# Patient Record
Sex: FEMALE | Race: BLACK OR AFRICAN AMERICAN | Employment: FULL TIME | URBAN - METROPOLITAN AREA
[De-identification: names, ages, dates, MRNs, and addresses within clinical notes are randomized per-mention and may not be internally consistent; named-entity substitution may affect disease eponyms.]

---

## 2021-12-16 ENCOUNTER — APPOINTMENT (OUTPATIENT)
Dept: GENERAL RADIOLOGY | Age: 31
End: 2021-12-16
Attending: EMERGENCY MEDICINE
Payer: OTHER MISCELLANEOUS

## 2021-12-16 ENCOUNTER — HOSPITAL ENCOUNTER (EMERGENCY)
Age: 31
Discharge: HOME | End: 2021-12-16
Attending: EMERGENCY MEDICINE
Payer: OTHER MISCELLANEOUS

## 2021-12-16 VITALS
RESPIRATION RATE: 16 BRPM | DIASTOLIC BLOOD PRESSURE: 78 MMHG | WEIGHT: 165 LBS | HEART RATE: 78 BPM | SYSTOLIC BLOOD PRESSURE: 114 MMHG | OXYGEN SATURATION: 100 % | HEIGHT: 66 IN | TEMPERATURE: 98 F | BODY MASS INDEX: 26.52 KG/M2

## 2021-12-16 DIAGNOSIS — S40.012A CONTUSION OF LEFT SHOULDER, INITIAL ENCOUNTER: ICD-10-CM
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
## 2021-12-16 DIAGNOSIS — S53.402A ELBOW SPRAIN, LEFT, INITIAL ENCOUNTER: ICD-10-CM

## 2021-12-16 DIAGNOSIS — S29.019A THORACIC MYOFASCIAL STRAIN, INITIAL ENCOUNTER: ICD-10-CM

## 2021-12-16 PROCEDURE — 74011250637 HC RX REV CODE- 250/637: Performed by: PHYSICIAN ASSISTANT

## 2021-12-16 PROCEDURE — 96372 THER/PROPH/DIAG INJ SC/IM: CPT

## 2021-12-16 PROCEDURE — 74011250637 HC RX REV CODE- 250/637: Performed by: EMERGENCY MEDICINE

## 2021-12-16 PROCEDURE — 73030 X-RAY EXAM OF SHOULDER: CPT

## 2021-12-16 PROCEDURE — 99283 EMERGENCY DEPT VISIT LOW MDM: CPT

## 2021-12-16 PROCEDURE — 71100 X-RAY EXAM RIBS UNI 2 VIEWS: CPT

## 2021-12-16 PROCEDURE — 74011250636 HC RX REV CODE- 250/636: Performed by: PHYSICIAN ASSISTANT

## 2021-12-16 PROCEDURE — 73080 X-RAY EXAM OF ELBOW: CPT

## 2021-12-16 RX ORDER — HYDROCODONE BITARTRATE AND ACETAMINOPHEN 5; 325 MG/1; MG/1
1-2 TABLET ORAL
Qty: 11 TABLET | Refills: 0 | Status: SHIPPED | OUTPATIENT
Start: 2021-12-16 | End: 2021-12-19

## 2021-12-16 RX ORDER — NAPROXEN 250 MG/1
500 TABLET ORAL ONCE
Status: COMPLETED | OUTPATIENT
Start: 2021-12-16 | End: 2021-12-16

## 2021-12-16 RX ORDER — ONDANSETRON 4 MG/1
4 TABLET, ORALLY DISINTEGRATING ORAL
Status: COMPLETED | OUTPATIENT
Start: 2021-12-16 | End: 2021-12-16

## 2021-12-16 RX ORDER — METAXALONE 800 MG/1
800 TABLET ORAL ONCE
Status: COMPLETED | OUTPATIENT
Start: 2021-12-16 | End: 2021-12-16

## 2021-12-16 RX ORDER — NAPROXEN 500 MG/1
500 TABLET ORAL 2 TIMES DAILY WITH MEALS
Qty: 20 TABLET | Refills: 0 | Status: SHIPPED | OUTPATIENT
Start: 2021-12-16

## 2021-12-16 RX ORDER — HYDROMORPHONE HYDROCHLORIDE 1 MG/ML
1 INJECTION, SOLUTION INTRAMUSCULAR; INTRAVENOUS; SUBCUTANEOUS ONCE
Status: COMPLETED | OUTPATIENT
Start: 2021-12-16 | End: 2021-12-16

## 2021-12-16 RX ORDER — METHOCARBAMOL 750 MG/1
1500 TABLET, FILM COATED ORAL 4 TIMES DAILY
Qty: 40 TABLET | Refills: 0 | Status: SHIPPED | OUTPATIENT
Start: 2021-12-16

## 2021-12-16 RX ADMIN — HYDROMORPHONE HYDROCHLORIDE 1 MG: 1 INJECTION, SOLUTION INTRAMUSCULAR; INTRAVENOUS; SUBCUTANEOUS at 11:03

## 2021-12-16 RX ADMIN — ONDANSETRON 4 MG: 4 TABLET, ORALLY DISINTEGRATING ORAL at 11:03

## 2021-12-16 RX ADMIN — METAXALONE 800 MG: 800 TABLET ORAL at 12:53

## 2021-12-16 RX ADMIN — NAPROXEN 500 MG: 250 TABLET ORAL at 12:53

## 2021-12-16 NOTE — Clinical Note
[address] [facility] EMERGENCY DEPT   [facility] [id number]  [phone]    Work/School Note    Date: 12/16/2021    To Whom It May concern:    Colton Muñoz was seen and treated today in the emergency room by the following provider(s):  Attending Provider: Corrine Leary MD.      Colton Muñoz is excused from work/school on 12/16/21 and 12/17/21. She is medically clear to return to work/school on 12/18/2021.        Sincerely,          Sigrid Huitron MD

## 2021-12-16 NOTE — ED NOTES
I have reviewed discharge instructions with the patient and spouse. The patient and spouse verbalized understanding. Patient left ED via Discharge Method: ambulatory to Home with (family). Opportunity for questions and clarification provided. Patient given 3 scripts. To continue your aftercare when you leave the hospital, you may receive an automated call from our care team to check in on how you are doing. This is a free service and part of our promise to provide the best care and service to meet your aftercare needs.  If you have questions, or wish to unsubscribe from this service please call [phone]. Thank you for Choosing our [facility] Emergency Department.

## 2021-12-16 NOTE — ED TRIAGE NOTES
Patient ambulatory to triage with mask in place. Patient reports fell on the stairs at work. Pt reports left shoulder/arm pain.

## 2021-12-16 NOTE — DISCHARGE INSTRUCTIONS
Take medications as directed  Wear the sling for up to 48 hours as needed for comfort  Do not drink alcohol or drive while taking the prescription pain medications    Follow-up with occupational medicine    Avoid any heavy lifting  Apply ice to your elbow and shoulder    Return to ER for any worsening symptoms or new problems which may arise

## 2021-12-16 NOTE — ED PROVIDER NOTES
24-year-old female presents to the ER with complaints of left shoulder and left elbow, and left back pain  Patient slipped and fell while walking or yesterday landing on the left side  Denies head injury. No neck pain. Patient is right-hand dominant    The history is provided by the patient and the spouse. Shoulder Pain   The incident occurred 1 to 2 hours ago. The incident occurred at work. The injury mechanism was a fall. The left shoulder is affected. The pain is moderate. The pain has been constant since onset. The pain radiates. There is no history of shoulder injury. She has other injuries. There is no history of shoulder surgery. Pertinent negatives include no muscle weakness and no tingling. She reports no foreign bodies present. No past medical history on file. No past surgical history on file. No family history on file. Social History     Socioeconomic History    Marital status:      Spouse name: Not on file    Number of children: Not on file    Years of education: Not on file    Highest education level: Not on file   Occupational History    Not on file   Tobacco Use    Smoking status: Not on file    Smokeless tobacco: Not on file   Substance and Sexual Activity    Alcohol use: Not on file    Drug use: Not on file    Sexual activity: Not on file   Other Topics Concern    Not on file   Social History Narrative    Not on file     Social Determinants of Health     Financial Resource Strain:     Difficulty of Paying Living Expenses: Not on file   Food Insecurity:     Worried About Running Out of Food in the Last Year: Not on file    Katerina of Food in the Last Year: Not on file   Transportation Needs:     Lack of Transportation (Medical): Not on file    Lack of Transportation (Non-Medical):  Not on file   Physical Activity:     Days of Exercise per Week: Not on file    Minutes of Exercise per Session: Not on file   Stress:     Feeling of Stress : Not on file Social Connections:     Frequency of Communication with Friends and Family: Not on file    Frequency of Social Gatherings with Friends and Family: Not on file    Attends Confucianism Services: Not on file    Active Member of Clubs or Organizations: Not on file    Attends Club or Organization Meetings: Not on file    Marital Status: Not on file   Intimate Partner Violence:     Fear of Current or Ex-Partner: Not on file    Emotionally Abused: Not on file    Physically Abused: Not on file    Sexually Abused: Not on file   Housing Stability:     Unable to Pay for Housing in the Last Year: Not on file    Number of [place] in the Last Year: Not on file    Unstable Housing in the Last Year: Not on file         ALLERGIES: Tree and shrub pollen    Review of Systems   Constitutional: Negative for chills and fever. HENT: Negative for congestion, ear pain and rhinorrhea. Eyes: Negative for photophobia and discharge. Respiratory: Negative for cough and shortness of breath. Cardiovascular: Negative for chest pain and palpitations. Gastrointestinal: Negative for abdominal pain, constipation, diarrhea and vomiting. Endocrine: Negative for cold intolerance and heat intolerance. Genitourinary: Negative for dysuria and flank pain. Musculoskeletal: Negative for arthralgias, myalgias and neck pain. Skin: Negative for rash and wound. Allergic/Immunologic: Negative for environmental allergies and food allergies. Neurological: Negative for tingling, syncope and headaches. Hematological: Negative for adenopathy. Does not bruise/bleed easily. Psychiatric/Behavioral: Negative for dysphoric mood. The patient is not nervous/anxious. All other systems reviewed and are negative.       Vitals:    12/16/21 0943 12/16/21 1207   BP: 114/78    Pulse: 78    Resp: 16    Temp: 98 °F (36.7 °C)    SpO2: 100% 100%   Weight: 74.8 kg (165 lb)    Height: 5' 6\" (1.676 m)             Physical Exam  Vitals and nursing note reviewed. Constitutional:       General: She is in acute distress. Appearance: Normal appearance. She is well-developed and normal weight. HENT:      Head: Normocephalic and atraumatic. Right Ear: External ear normal.      Left Ear: External ear normal.      Mouth/Throat:      Pharynx: No oropharyngeal exudate. Eyes:      Extraocular Movements: Extraocular movements intact. Conjunctiva/sclera: Conjunctivae normal.      Pupils: Pupils are equal, round, and reactive to light. Neck:      Vascular: No JVD. Cardiovascular:      Rate and Rhythm: Normal rate and regular rhythm. Pulses: Normal pulses. Heart sounds: Normal heart sounds. No murmur heard. No friction rub. No gallop. Pulmonary:      Effort: Pulmonary effort is normal.      Breath sounds: Normal breath sounds. Musculoskeletal:         General: No deformity. Right shoulder: Normal.      Left shoulder: Tenderness and bony tenderness present. No crepitus. Decreased range of motion. Normal strength. Normal pulse. Right elbow: Normal.      Left elbow: No deformity or effusion. Normal range of motion. Tenderness present. Cervical back: Normal, normal range of motion and neck supple. Thoracic back: Tenderness and bony tenderness present. No swelling. Lumbar back: Normal.        Back:    Skin:     General: Skin is warm and dry. Capillary Refill: Capillary refill takes less than 2 seconds. Findings: No rash. Neurological:      General: No focal deficit present. Mental Status: She is alert and oriented to person, place, and time. Cranial Nerves: No cranial nerve deficit. Sensory: No sensory deficit. Gait: Gait normal.   Psychiatric:         Mood and Affect: Mood and affect normal.         Speech: Speech normal.         Behavior: Behavior normal. Behavior is cooperative. Thought Content:  Thought content normal.         Cognition and Memory: Cognition and memory normal.         Judgment: Judgment normal.          MDM  Number of Diagnoses or Management Options  Contusion of left shoulder, initial encounter: new and requires workup  Elbow sprain, left, initial encounter: new and requires workup  Fall, initial encounter: new and requires workup  Thoracic myofascial strain, initial encounter: new and requires workup  Diagnosis management comments: 57-year-old female presents after fall on her way to work today. Initial imaging of the left shoulder is unremarkable. We will also obtain imaging of the left elbow and left ribs based on pain and with palpation in those locations as well. Patient was already given a dose of narcotics at triage for pain. We will add a nonsteroidal and muscle relaxer while we obtain the rest of her images    1:45 PM  All imaging studies unremarkable    Patient will be placed in a sling for comfort    Instructed to follow-up with occupational medicine for recheck visit    Sling for up to 48 hours       Amount and/or Complexity of Data Reviewed  Tests in the radiology section of CPT®: ordered and reviewed  Tests in the medicine section of CPT®: ordered and reviewed  Decide to obtain previous medical records or to obtain history from someone other than the patient: yes  Obtain history from someone other than the patient: yes  Review and summarize past medical records: yes  Independent visualization of images, tracings, or specimens: yes    Risk of Complications, Morbidity, and/or Mortality  Presenting problems: moderate  Diagnostic procedures: moderate  Management options: low  General comments: Elements of this note have been dictated via voice recognition software. Text and phrases may be limited by the accuracy of the software. The chart has been reviewed, but errors may still be present.       Patient Progress  Patient progress: improved         Procedures

## 2022-07-05 ENCOUNTER — OFFICE VISIT (OUTPATIENT)
Dept: OCCUPATIONAL MEDICINE | Age: 32
End: 2022-07-05

## 2022-07-05 VITALS
RESPIRATION RATE: 12 BRPM | SYSTOLIC BLOOD PRESSURE: 130 MMHG | DIASTOLIC BLOOD PRESSURE: 80 MMHG | HEART RATE: 90 BPM | OXYGEN SATURATION: 98 % | TEMPERATURE: 98.2 F

## 2022-07-05 DIAGNOSIS — J02.0 STREPTOCOCCAL PHARYNGITIS: Primary | ICD-10-CM

## 2022-07-05 PROBLEM — Z20.822 EXPOSURE TO COVID-19 VIRUS: Status: ACTIVE | Noted: 2020-09-05

## 2022-07-05 PROBLEM — J01.10 ACUTE NON-RECURRENT FRONTAL SINUSITIS: Status: ACTIVE | Noted: 2019-12-26

## 2022-07-05 PROCEDURE — 99213 OFFICE O/P EST LOW 20 MIN: CPT

## 2022-07-05 RX ORDER — AZITHROMYCIN 250 MG/1
250 TABLET, FILM COATED ORAL SEE ADMIN INSTRUCTIONS
Qty: 6 TABLET | Refills: 0 | Status: SHIPPED | OUTPATIENT
Start: 2022-07-05 | End: 2022-07-10

## 2022-07-05 ASSESSMENT — ENCOUNTER SYMPTOMS
ALLERGIC/IMMUNOLOGIC NEGATIVE: 1
RHINORRHEA: 0
EYES NEGATIVE: 1
GASTROINTESTINAL NEGATIVE: 1
RESPIRATORY NEGATIVE: 1
TROUBLE SWALLOWING: 0
SINUS PAIN: 0
SORE THROAT: 1
SINUS PRESSURE: 0

## 2022-07-05 NOTE — PATIENT INSTRUCTIONS
Patient Education        Strep Throat: Care Instructions  Your Care Instructions     Strep throat is a bacterial infection that causes sudden, severe sore throat and fever. Strep throat, which is caused by bacteria called streptococcus, is treated with antibiotics. Sometimes a strep test is necessary to tell if the sore throat is caused by strep bacteria. Treatment can help ease symptoms andmay prevent future problems. Follow-up care is a key part of your treatment and safety. Be sure to make and go to all appointments, and call your doctor if you are having problems. It's also a good idea to know your test results and keep alist of the medicines you take. How can you care for yourself at home?  Take your antibiotics as directed. Do not stop taking them just because you feel better. You need to take the full course of antibiotics.  Strep throat can spread to others until 24 hours after you begin taking antibiotics. During this time, avoid contact with other people at work, school, or home, especially infants and children. Do not sneeze or cough on others, and wash your hands often. Keep your drinking glass and eating utensils separate from those of others. Wash these items well in hot, soapy water.  Gargle with warm salt water at least once each hour to help reduce swelling and make your throat feel better. Use 1 teaspoon of salt mixed in 8 fluid ounces of warm water.  Take an over-the-counter pain medication, such as acetaminophen (Tylenol), ibuprofen (Advil, Motrin), or naproxen (Aleve). Read and follow all instructions on the label.  Try an over-the-counter anesthetic throat spray or throat lozenges, which may help relieve throat pain.  Drink plenty of fluids. Fluids may help soothe an irritated throat. Hot fluids, such as tea or soup, may help your throat feel better.  Eat soft solids and drink plenty of clear liquids.  Flavored ice pops, ice cream, scrambled eggs, sherbet, and gelatin dessert (such as Jell-O) may also soothe the throat.  Get lots of rest.   Do not smoke, and avoid secondhand smoke. If you need help quitting, talk to your doctor about stop-smoking programs and medicines. These can increase your chances of quitting for good.  Use a vaporizer or humidifier to add moisture to the air in your bedroom. Follow the directions for cleaning the machine. When should you call for help? Call your doctor now or seek immediate medical care if:     You have a new or higher fever.      You have a fever with a stiff neck or severe headache.      You have new or worse trouble swallowing.      Your sore throat gets much worse on one side.      Your pain becomes much worse on one side of your throat. Watch closely for changes in your health, and be sure to contact your doctor if:     You are not getting better after 2 days (48 hours).      You do not get better as expected. Where can you learn more? Go to [email or web address]://[email or web address]. org and sign in to your Gravity account. Enter K625 in the Blue Rooster box to learn more about \"Strep Throat: Care Instructions. \"     If you do not have an account, please click on the \"Sign Up Now\" link. Current as of: September 8, 2021               Content Version: 13.3  © 2006-2022 Healthwise, Incorporated. Care instructions adapted under license by [facility] ([facility]). If you have questions about a medical condition or this instruction, always ask your healthcare professional. [place] [address] any warranty or liability for your use of this information.

## 2022-07-05 NOTE — LETTER
[facility]  [address] [id number]  Phone: [phone]  Fax: [phone]    JENNI NP        July 5, 2022     Patient: Mitzi Clements   YOB: 1990   Date of Visit: 7/5/2022       To Whom it May Concern:    Mitzi Clements was seen in my clinic on 7/5/2022. She may return to work on 7/7/22. If you have any questions or concerns, please don't hesitate to call.     Sincerely,         JENNI NP

## 2022-07-05 NOTE — PROGRESS NOTES
130/80 (Site: Right Upper Arm, Position: Sitting)   Pulse 90   Temp 98.2 °F (36.8 °C) (Oral)   Resp 12   LMP 07/05/2022   SpO2 98%      No results found for this visit on 07/05/22. Physical Exam  Constitutional:       Appearance: Normal appearance. She is normal weight. HENT:      Head: Normocephalic and atraumatic. Right Ear: Tympanic membrane, ear canal and external ear normal.      Left Ear: Tympanic membrane, ear canal and external ear normal.      Nose: Nose normal. No congestion or rhinorrhea. Mouth/Throat:      Mouth: Mucous membranes are moist.      Pharynx: Pharyngeal swelling and posterior oropharyngeal erythema present. No oropharyngeal exudate. Tonsils: No tonsillar exudate or tonsillar abscesses. Eyes:      Conjunctiva/sclera: Conjunctivae normal.      Pupils: Pupils are equal, round, and reactive to light. Cardiovascular:      Rate and Rhythm: Normal rate and regular rhythm. Pulses: Normal pulses. Heart sounds: Normal heart sounds. Pulmonary:      Effort: Pulmonary effort is normal.      Breath sounds: Normal breath sounds. Musculoskeletal:      Cervical back: Normal range of motion and neck supple. Skin:     General: Skin is warm and dry. Neurological:      General: No focal deficit present. Mental Status: She is alert and oriented to person, place, and time. Psychiatric:         Mood and Affect: Mood normal.         Behavior: Behavior normal.         Thought Content: Thought content normal.         Judgment: Judgment normal.         ASSESSMENT and PLAN    Margarita was seen today for pharyngitis. Diagnoses and all orders for this visit:    Streptococcal pharyngitis  -     azithromycin (ZITHROMAX) 250 MG tablet; Take 1 tablet by mouth See Admin Instructions for 5 days 500mg on day 1 followed by 250mg on days 2 - 5  -     AMB POC RAPID STREP A    Positive strep. Covid negative.  Ibuprofen as needed for throat pain and swelling per package

## 2022-07-11 ENCOUNTER — TELEPHONE (OUTPATIENT)
Dept: OCCUPATIONAL MEDICINE | Age: 32
End: 2022-07-11

## 2022-07-11 DIAGNOSIS — T36.95XA ANTIBIOTIC-INDUCED YEAST INFECTION: Primary | ICD-10-CM

## 2022-07-11 DIAGNOSIS — B37.9 ANTIBIOTIC-INDUCED YEAST INFECTION: Primary | ICD-10-CM

## 2022-07-11 RX ORDER — FLUCONAZOLE 150 MG/1
150 TABLET ORAL ONCE
Qty: 1 TABLET | Refills: 0 | Status: SHIPPED | OUTPATIENT
Start: 2022-07-11 | End: 2022-07-11

## 2022-07-11 NOTE — TELEPHONE ENCOUNTER
Patient reporting new vaginal discharge and itching following recent antibiotic course. Denies foul odor or abnormal vaginal bleeding at this time. Suspect antibiotic induced yeast infection. Prescription of Diflucan provided to pharmacy as requested. Follow up with PCP or OBGYN if no improvement of symptoms despite medication.

## 2022-08-29 ENCOUNTER — OFFICE VISIT (OUTPATIENT)
Dept: OCCUPATIONAL MEDICINE | Age: 32
End: 2022-08-29

## 2022-08-29 VITALS
RESPIRATION RATE: 16 BRPM | HEART RATE: 84 BPM | SYSTOLIC BLOOD PRESSURE: 138 MMHG | DIASTOLIC BLOOD PRESSURE: 98 MMHG | OXYGEN SATURATION: 99 % | TEMPERATURE: 98.6 F

## 2022-08-29 DIAGNOSIS — J02.9 SORE THROAT: ICD-10-CM

## 2022-08-29 DIAGNOSIS — J32.9 RHINOSINUSITIS: Primary | ICD-10-CM

## 2022-08-29 DIAGNOSIS — J31.0 RHINOSINUSITIS: Primary | ICD-10-CM

## 2022-08-29 LAB
GROUP A STREP ANTIGEN, POC: NEGATIVE
INFLUENZA A ANTIGEN, POC: NEGATIVE
INFLUENZA B ANTIGEN, POC: NEGATIVE
SARS-COV-2 RNA, POC: NEGATIVE
VALID INTERNAL CONTROL, POC: YES

## 2022-08-29 PROCEDURE — 4004F PT TOBACCO SCREEN RCVD TLK: CPT | Performed by: NURSE PRACTITIONER

## 2022-08-29 PROCEDURE — 87880 STREP A ASSAY W/OPTIC: CPT | Performed by: NURSE PRACTITIONER

## 2022-08-29 PROCEDURE — 87631 RESP VIRUS 3-5 TARGETS: CPT | Performed by: NURSE PRACTITIONER

## 2022-08-29 PROCEDURE — G8428 CUR MEDS NOT DOCUMENT: HCPCS | Performed by: NURSE PRACTITIONER

## 2022-08-29 PROCEDURE — G8419 CALC BMI OUT NRM PARAM NOF/U: HCPCS | Performed by: NURSE PRACTITIONER

## 2022-08-29 PROCEDURE — 99213 OFFICE O/P EST LOW 20 MIN: CPT | Performed by: NURSE PRACTITIONER

## 2022-08-29 RX ORDER — CETIRIZINE HYDROCHLORIDE 10 MG/1
10 TABLET ORAL DAILY
COMMUNITY
End: 2022-10-17

## 2022-08-29 ASSESSMENT — ENCOUNTER SYMPTOMS
DIARRHEA: 0
WHEEZING: 0
SINUS PRESSURE: 1
COUGH: 1
VOMITING: 0
EYE DISCHARGE: 0
NAUSEA: 0
RHINORRHEA: 0
SORE THROAT: 1
SINUS PAIN: 0
VOICE CHANGE: 1
SWOLLEN GLANDS: 0
EYE ITCHING: 0
EYE REDNESS: 1
EYE PAIN: 0
SHORTNESS OF BREATH: 0
ABDOMINAL PAIN: 0

## 2022-08-29 NOTE — PROGRESS NOTES
PROGRESS NOTE    SUBJECTIVE:   Daisy South is a 33 y.o. female seen for what she suspects is \"another sinus infection\". Reports that she has helped someone move for the past 2 weekends and that her alleriges have been consistently flared up since despite taking her Cetirizine and restarting her Fluticasone spray 2 days ago. Denies any known or suspect sick contact and states no one else in household has similar symptoms. Chief Complaint    URI         URI   This is a new problem. The current episode started in the past 7 days. The problem has been gradually worsening. Associated symptoms include coughing, headaches, sneezing and a sore throat. Pertinent negatives include no abdominal pain, chest pain, congestion, diarrhea, dysuria, ear pain, joint pain, joint swelling, nausea, neck pain, plugged ear sensation, rash, rhinorrhea, sinus pain, swollen glands, vomiting or wheezing. Associated symptoms comments: Hoarseness, postnasal drainage. Treatments tried: Zyrtec, pseudofed, flonase, sinus rinse. The treatment provided mild relief. Current Outpatient Medications   Medication Sig Dispense Refill    cetirizine (ZYRTEC) 10 MG tablet Take 10 mg by mouth daily      fluticasone (FLONASE) 50 MCG/ACT nasal spray 2 sprays by Nasal route daily as needed       No current facility-administered medications for this visit. Allergies   Allergen Reactions    Other Other (See Comments)     Tree and shrub pollen-sneezing             Review of Systems   Constitutional:  Positive for chills. Negative for fatigue. HENT:  Positive for postnasal drip, sinus pressure, sneezing, sore throat and voice change. Negative for congestion, ear pain, rhinorrhea and sinus pain. Eyes:  Positive for redness. Negative for pain, discharge and itching. Respiratory:  Positive for cough. Negative for shortness of breath and wheezing. Cardiovascular:  Negative for chest pain.    Gastrointestinal:  Negative for abdominal pain, diarrhea, nausea and vomiting. Genitourinary:  Negative for dysuria. Musculoskeletal:  Negative for joint pain and neck pain. Skin:  Negative for rash. Allergic/Immunologic: Positive for environmental allergies. Neurological:  Positive for headaches. Negative for dizziness and light-headedness. OBJECTIVE:  BP (!) 138/98 (Site: Left Upper Arm, Position: Sitting, Cuff Size: Medium Adult)   Pulse 84   Temp 98.6 °F (37 °C) (Oral)   Resp 16   LMP 08/03/2022 (Approximate)   SpO2 99%      Results for orders placed or performed in visit on 08/29/22   AMB POC SARS-COV-2 AND INFLUENZA A/B   Result Value Ref Range    SARS-COV-2 RNA, POC Negative     Influenza A Antigen, POC Negative Negative    Influenza B Antigen, POC Negative Negative   AMB POC RAPID STREP A   Result Value Ref Range    Valid Internal Control, POC yes     Group A Strep Antigen, POC Negative Negative       Physical Exam  Vitals reviewed. Constitutional:       General: She is awake. She is not in acute distress. Appearance: Normal appearance. She is well-developed and well-groomed. HENT:      Head: Normocephalic. Right Ear: Hearing and external ear normal. A middle ear effusion is present. Left Ear: Hearing and external ear normal. A middle ear effusion is present. Nose: Mucosal edema present. Right Turbinates: Swollen and pale. Left Turbinates: Swollen and pale. Right Sinus: Maxillary sinus tenderness present. No frontal sinus tenderness. Left Sinus: No maxillary sinus tenderness or frontal sinus tenderness. Mouth/Throat:      Lips: Pink. No lesions. Mouth: Mucous membranes are moist.      Pharynx: Uvula midline. Pharyngeal swelling, posterior oropharyngeal erythema and uvula swelling present. No oropharyngeal exudate. Tonsils: No tonsillar exudate. 2+ on the right. 2+ on the left.    Eyes:      General: Lids are normal.      Conjunctiva/sclera: Conjunctivae normal.   Neck: Trachea: No tracheal deviation. Cardiovascular:      Rate and Rhythm: Normal rate and regular rhythm. Heart sounds: Normal heart sounds. Pulmonary:      Effort: Pulmonary effort is normal.      Breath sounds: Normal breath sounds. Musculoskeletal:      Cervical back: Neck supple. Lymphadenopathy:      Head:      Right side of head: Submandibular adenopathy present. No submental, tonsillar, preauricular, posterior auricular or occipital adenopathy. Left side of head: Submandibular adenopathy present. No submental, tonsillar, preauricular, posterior auricular or occipital adenopathy. Neurological:      Mental Status: She is alert and oriented to person, place, and time. Psychiatric:         Behavior: Behavior is cooperative. ASSESSMENT and PLAN    Haleigh Ray was seen today for uri. Diagnoses and all orders for this visit:    Rhinosinusitis  Comments:  Continue Cetirizine and Fluticasone. Encouraged rest, hydration, saline sinus rinses, and OTC cold medication per packaging for relief. RTC as needed  Orders:  -     AMB POC SARS-COV-2 AND INFLUENZA A/B    Sore throat  Comments:  Continue Cetirizine and Fluticasone. Encouraged rest, hydration, saline sinus rinses, and OTC cold medication per packaging for relief. RTC as needed  Orders:  -     AMB POC RAPID STREP A      Counseled on benefits of having a primary care provider which includes, but is not limited to, continuity of care and having a medical home when concerns arise. Also enforced that onsite clinic policy states that we are not to take the place of a primary care provider, pt verbalized understanding. SEs and risk vs benefits associated with medications prescribed discussed with patient who verbalized understanding. Pt verbalized understanding and agreement with plan of care. RTC for persisting/worsening symptoms or new complaints that arise.  Discussed signs and symptoms that would warrant immediate evaluation including, but not limited to HA, blurred vision, speech disturbance, difficulty with ambulation/gait, numbness, tingling, weakness, syncope, chest pain, or shortness of breath. I have reviewed the patient's medication list, past medical, family, social, and surgical history in detail and updated the patient record appropriately.     Aliyah Lamar, APRN - CNP

## 2022-10-17 ENCOUNTER — OFFICE VISIT (OUTPATIENT)
Dept: OCCUPATIONAL MEDICINE | Age: 32
End: 2022-10-17

## 2022-10-17 DIAGNOSIS — J10.1 INFLUENZA A: ICD-10-CM

## 2022-10-17 DIAGNOSIS — J02.0 STREPTOCOCCAL PHARYNGITIS: Primary | ICD-10-CM

## 2022-10-17 DIAGNOSIS — R68.89 FLU-LIKE SYMPTOMS: ICD-10-CM

## 2022-10-17 DIAGNOSIS — J02.9 SORE THROAT: ICD-10-CM

## 2022-10-17 LAB
GROUP A STREP ANTIGEN, POC: POSITIVE
INFLUENZA A ANTIGEN, POC: POSITIVE
INFLUENZA B ANTIGEN, POC: NEGATIVE
SARS-COV-2 RNA, POC: NEGATIVE
VALID INTERNAL CONTROL, POC: YES

## 2022-10-17 RX ORDER — MONTELUKAST SODIUM 10 MG/1
TABLET ORAL
COMMUNITY
Start: 2022-09-16

## 2022-10-17 RX ORDER — ALBUTEROL SULFATE 90 UG/1
AEROSOL, METERED RESPIRATORY (INHALATION)
COMMUNITY
Start: 2022-10-10

## 2022-10-17 RX ORDER — AZITHROMYCIN 250 MG/1
TABLET, FILM COATED ORAL
Qty: 6 TABLET | Refills: 0 | Status: SHIPPED | OUTPATIENT
Start: 2022-10-17

## 2022-10-17 RX ORDER — LEVOCETIRIZINE DIHYDROCHLORIDE 5 MG/1
TABLET, FILM COATED ORAL
COMMUNITY
Start: 2022-09-16

## 2022-10-17 ASSESSMENT — ENCOUNTER SYMPTOMS
EYE DISCHARGE: 0
DIARRHEA: 0
EYE PAIN: 0
VOMITING: 0
COUGH: 1
SORE THROAT: 1
VOICE CHANGE: 0
NAUSEA: 0
CHEST TIGHTNESS: 1
SINUS PAIN: 0
EYE REDNESS: 0
RHINORRHEA: 1
EYE ITCHING: 0
WHEEZING: 0
SINUS PRESSURE: 1
ABDOMINAL PAIN: 0
TROUBLE SWALLOWING: 0
SHORTNESS OF BREATH: 0
SWOLLEN GLANDS: 1

## 2022-10-17 NOTE — LETTER
[facility] [address] [id number]  Phone: [phone]  Fax: [phone]    CARLOS A Baker CNP        October 17, 2022     Patient: Geovanny Zuniga   YOB: 1990   Date of Visit: 10/17/2022       To Whom it May Concern:    Geovanny Zuniga was seen in my clinic on 10/17/2022. She may return to work on 10/18/22 if feeling better. If you have any questions or concerns, please don't hesitate to call.     Sincerely,         CARLOS A Baker CNP

## 2022-10-17 NOTE — PROGRESS NOTES
PROGRESS NOTE    SUBJECTIVE:   Anup Murray is a 33 y.o. female seen for flu like symptoms that started 4 days ago. Denies any known or suspected sick contacts but states her son has been having a runny nose. Chief Complaint    URI         URI   This is a new problem. The current episode started in the past 7 days. The problem has been unchanged. Associated symptoms include congestion, coughing, headaches, a plugged ear sensation, rhinorrhea, sneezing, a sore throat and swollen glands. Pertinent negatives include no abdominal pain, chest pain, diarrhea, dysuria, ear pain, joint pain, joint swelling, nausea, neck pain, rash, sinus pain, vomiting or wheezing. She has tried sleep (OTC cold and flu medications) for the symptoms. The treatment provided mild relief. Current Outpatient Medications   Medication Sig Dispense Refill    azithromycin (ZITHROMAX) 250 MG tablet 500mg on day 1 followed by 250mg on days 2 - 5 6 tablet 0    Fluticasone-Umeclidin-Vilant (TRELEGY ELLIPTA IN) Inhale into the lungs      albuterol sulfate HFA (PROVENTIL;VENTOLIN;PROAIR) 108 (90 Base) MCG/ACT inhaler       levocetirizine (XYZAL) 5 MG tablet TAKE 1 TABLET BY MOUTH EVERY DAY IN THE EVENING FOR 30 DAYS      montelukast (SINGULAIR) 10 MG tablet TAKE 1 TABLET BY MOUTH EVERY DAY FOR 30 DAYS      fluticasone (FLONASE) 50 MCG/ACT nasal spray 2 sprays by Nasal route daily as needed       No current facility-administered medications for this visit. Allergies   Allergen Reactions    Other Other (See Comments)     Tree and shrub pollen-sneezing             Review of Systems   Constitutional:  Positive for chills and fatigue. Negative for fever. HENT:  Positive for congestion, postnasal drip, rhinorrhea, sinus pressure, sneezing and sore throat. Negative for ear pain, sinus pain, trouble swallowing and voice change. Eyes:  Negative for pain, discharge, redness and itching. Respiratory:  Positive for cough and chest tightness. Negative for shortness of breath and wheezing. Cardiovascular:  Negative for chest pain. Gastrointestinal:  Negative for abdominal pain, diarrhea, nausea and vomiting. Genitourinary:  Negative for dysuria. Musculoskeletal:  Negative for joint pain and neck pain. Skin:  Negative for rash. Allergic/Immunologic: Positive for environmental allergies. Neurological:  Positive for headaches. Negative for dizziness and light-headedness. OBJECTIVE:  There were no vitals taken for this visit. Results for orders placed or performed in visit on 10/17/22   AMB POC RAPID STREP A   Result Value Ref Range    Valid Internal Control, POC yes     Group A Strep Antigen, POC Positive Negative   AMB POC SARS-COV-2 AND INFLUENZA A/B   Result Value Ref Range    SARS-COV-2 RNA, POC Negative     Influenza A Antigen, POC Positive Negative    Influenza B Antigen, POC Negative Negative       Physical Exam  Vitals reviewed. Constitutional:       General: She is awake. She is not in acute distress. Appearance: Normal appearance. She is well-developed and well-groomed. She is ill-appearing. HENT:      Head: Normocephalic. Right Ear: Hearing normal.      Left Ear: Hearing normal.      Nose: Mucosal edema present. Right Turbinates: Swollen. Left Turbinates: Swollen. Right Sinus: No maxillary sinus tenderness or frontal sinus tenderness. Left Sinus: No maxillary sinus tenderness or frontal sinus tenderness. Mouth/Throat:      Lips: Pink. No lesions. Mouth: Mucous membranes are moist.      Pharynx: Uvula midline. Pharyngeal swelling and posterior oropharyngeal erythema present. Tonsils: No tonsillar exudate. Eyes:      General: Lids are normal.      Conjunctiva/sclera: Conjunctivae normal.   Neck:      Trachea: No tracheal deviation. Pulmonary:      Effort: Pulmonary effort is normal.   Musculoskeletal:      Cervical back: Neck supple.    Lymphadenopathy:      Head: Right side of head: Submandibular adenopathy present. No submental, tonsillar, preauricular, posterior auricular or occipital adenopathy. Left side of head: Submandibular adenopathy present. No submental, tonsillar, preauricular, posterior auricular or occipital adenopathy. Neurological:      Mental Status: She is alert and oriented to person, place, and time. Psychiatric:         Behavior: Behavior is cooperative. ASSESSMENT and PLAN    Hattie Ortiz was seen today for uri. Diagnoses and all orders for this visit:    Streptococcal pharyngitis  Comments:  Azithromycin as prescribed with food. Infection control discussed and work excuse provided. Change toothbrush Wednesday. RTC as needed  Orders:  -     azithromycin (ZITHROMAX) 250 MG tablet; 500mg on day 1 followed by 250mg on days 2 - 5    Influenza A  Comments:  Outside window for antivirals. Encouraged rest, hydration, OTC therapies per package instructions for additional relief. RTC as needed    Sore throat  Comments:  Strep positive at this time  Orders:  -     azithromycin (ZITHROMAX) 250 MG tablet; 500mg on day 1 followed by 250mg on days 2 - 5  -     AMB POC RAPID STREP A    Flu-like symptoms  Comments:  Flu A positive at this time  Orders:  -     AMB POC SARS-COV-2 AND INFLUENZA A/B      Counseled on benefits of having a primary care provider which includes, but is not limited to, continuity of care and having a medical home when concerns arise. Also enforced that onsite clinic policy states that we are not to take the place of a primary care provider, pt verbalized understanding. SEs and risk vs benefits associated with medications prescribed discussed with patient who verbalized understanding. Pt verbalized understanding and agreement with plan of care. RTC for persisting/worsening symptoms or new complaints that arise.  Discussed signs and symptoms that would warrant immediate evaluation including, but not limited to HA, blurred vision, speech disturbance, difficulty with ambulation/gait, numbness, tingling, weakness, syncope, chest pain, or shortness of breath. I have reviewed the patient's medication list, past medical, family, social, and surgical history in detail and updated the patient record appropriately.     Norman Nieto, APRN - CNP

## 2022-11-17 ENCOUNTER — OFFICE VISIT (OUTPATIENT)
Dept: OCCUPATIONAL MEDICINE | Age: 32
End: 2022-11-17

## 2022-11-17 VITALS
DIASTOLIC BLOOD PRESSURE: 74 MMHG | TEMPERATURE: 98.8 F | SYSTOLIC BLOOD PRESSURE: 112 MMHG | HEART RATE: 75 BPM | OXYGEN SATURATION: 99 % | RESPIRATION RATE: 16 BRPM

## 2022-11-17 DIAGNOSIS — T88.7XXA SIDE EFFECT OF MEDICATION: ICD-10-CM

## 2022-11-17 DIAGNOSIS — Z20.818 STREP THROAT EXPOSURE: ICD-10-CM

## 2022-11-17 DIAGNOSIS — R68.89 FLU-LIKE SYMPTOMS: Primary | ICD-10-CM

## 2022-11-17 LAB
GROUP A STREP ANTIGEN, POC: NEGATIVE
INFLUENZA A ANTIGEN, POC: NEGATIVE
INFLUENZA A ANTIGEN, POC: POSITIVE
INFLUENZA B ANTIGEN, POC: NEGATIVE
INFLUENZA B ANTIGEN, POC: NEGATIVE
SARS-COV-2 RNA, POC: NEGATIVE
VALID INTERNAL CONTROL, POC: YES
VALID INTERNAL CONTROL, POC: YES

## 2022-11-17 RX ORDER — ONDANSETRON 4 MG/1
4 TABLET, ORALLY DISINTEGRATING ORAL EVERY 8 HOURS PRN
Qty: 9 TABLET | Refills: 0 | Status: SHIPPED | OUTPATIENT
Start: 2022-11-17

## 2022-11-17 RX ORDER — FEXOFENADINE HCL 180 MG/1
TABLET ORAL
COMMUNITY
Start: 2022-10-11

## 2022-11-17 RX ORDER — OSELTAMIVIR PHOSPHATE 75 MG/1
75 CAPSULE ORAL 2 TIMES DAILY
Qty: 10 CAPSULE | Refills: 0 | Status: SHIPPED | OUTPATIENT
Start: 2022-11-17 | End: 2022-11-22

## 2022-11-17 ASSESSMENT — ENCOUNTER SYMPTOMS
SWOLLEN GLANDS: 1
NAUSEA: 0
ABDOMINAL PAIN: 0
SINUS PAIN: 0
COUGH: 1
EYE ITCHING: 0
RHINORRHEA: 1
SINUS PRESSURE: 0
WHEEZING: 0
VOMITING: 0
SORE THROAT: 1
EYE DISCHARGE: 0
EYE PAIN: 0
EYE REDNESS: 0
DIARRHEA: 0

## 2022-11-17 NOTE — PROGRESS NOTES
PROGRESS NOTE    SUBJECTIVE:   Christiana Pederson is a 33 y.o. female seen for cold symptoms that started yesterday. Patient reports she fully recovered from having flu and strep a month ago then started to have symptoms return yesterday. States she had exposure to 2 employees that tested positive for strep last week. Chief Complaint    URI         URI   This is a recurrent problem. The current episode started yesterday. The problem has been gradually worsening. There has been no fever. Associated symptoms include congestion, coughing, headaches, rhinorrhea, sneezing, a sore throat and swollen glands. Pertinent negatives include no abdominal pain, chest pain, diarrhea, dysuria, ear pain, joint pain, joint swelling, nausea, neck pain, plugged ear sensation, rash, sinus pain, vomiting or wheezing. She has tried nothing for the symptoms. Current Outpatient Medications   Medication Sig Dispense Refill    oseltamivir (TAMIFLU) 75 MG capsule Take 1 capsule by mouth 2 times daily for 5 days 10 capsule 0    ondansetron (ZOFRAN ODT) 4 MG disintegrating tablet Take 1 tablet by mouth every 8 hours as needed for Nausea or Vomiting 9 tablet 0    fexofenadine (ALLEGRA) 180 MG tablet TAKE 1 TABLET BY MOUTH EVERY DAY FOR 30 DAYS SWALLOW WHOLE WITH WATER DO NOT TAKE WITH FRUIT JUICES      albuterol sulfate HFA (PROVENTIL;VENTOLIN;PROAIR) 108 (90 Base) MCG/ACT inhaler       levocetirizine (XYZAL) 5 MG tablet TAKE 1 TABLET BY MOUTH EVERY DAY IN THE EVENING FOR 30 DAYS      montelukast (SINGULAIR) 10 MG tablet TAKE 1 TABLET BY MOUTH EVERY DAY FOR 30 DAYS      Fluticasone-Umeclidin-Vilant (TRELEGY ELLIPTA IN) Inhale into the lungs      fluticasone (FLONASE) 50 MCG/ACT nasal spray 2 sprays by Nasal route daily as needed       No current facility-administered medications for this visit.       Allergies   Allergen Reactions    Other Other (See Comments)     Tree and shrub pollen-sneezing             Review of Systems   Constitutional: Positive for chills and fatigue. Negative for fever. HENT:  Positive for congestion, postnasal drip, rhinorrhea, sneezing and sore throat. Negative for ear pain, sinus pressure and sinus pain. Eyes:  Negative for pain, discharge, redness and itching. Respiratory:  Positive for cough. Negative for wheezing. Cardiovascular:  Negative for chest pain. Gastrointestinal:  Negative for abdominal pain, diarrhea, nausea and vomiting. Genitourinary:  Negative for dysuria. Musculoskeletal:  Positive for myalgias. Negative for arthralgias, joint pain and neck pain. Skin:  Negative for rash. Neurological:  Positive for headaches. Negative for dizziness and light-headedness. OBJECTIVE:  /74 (Site: Left Upper Arm, Position: Sitting, Cuff Size: Medium Adult)   Pulse 75   Temp 98.8 °F (37.1 °C) (Oral)   Resp 16   SpO2 99%      Results for orders placed or performed in visit on 11/17/22   AMB POC SARS-COV-2 AND INFLUENZA A/B   Result Value Ref Range    SARS-COV-2 RNA, POC Negative     Influenza A Antigen, POC Positive Negative    Influenza B Antigen, POC Negative Negative   AMB POC RAPID STREP A   Result Value Ref Range    Valid Internal Control, POC yes     Group A Strep Antigen, POC Negative Negative   AMB POC RAPID INFLUENZA TEST   Result Value Ref Range    Valid Internal Control, POC yes     Influenza A Antigen, POC Negative Negative    Influenza B Antigen, POC Negative Negative       Physical Exam  Vitals reviewed. Constitutional:       General: She is awake. She is not in acute distress. Appearance: Normal appearance. She is well-developed and well-groomed. HENT:      Head: Normocephalic. Right Ear: Hearing and external ear normal. A middle ear effusion is present. Left Ear: Hearing and external ear normal. A middle ear effusion is present. Nose: Mucosal edema present. Right Turbinates: Swollen. Left Turbinates: Swollen.       Right Sinus: No maxillary sinus tenderness or frontal sinus tenderness. Left Sinus: No maxillary sinus tenderness or frontal sinus tenderness. Mouth/Throat:      Lips: Pink. No lesions. Mouth: Mucous membranes are moist.      Pharynx: Uvula midline. Pharyngeal swelling and posterior oropharyngeal erythema present. Tonsils: No tonsillar exudate. Eyes:      General: Lids are normal.      Conjunctiva/sclera: Conjunctivae normal.   Neck:      Trachea: No tracheal deviation. Cardiovascular:      Rate and Rhythm: Normal rate and regular rhythm. Heart sounds: Normal heart sounds. Pulmonary:      Effort: Pulmonary effort is normal.      Breath sounds: Normal breath sounds. Musculoskeletal:      Cervical back: Neck supple. Lymphadenopathy:      Head:      Right side of head: Submandibular adenopathy present. No submental, tonsillar, preauricular, posterior auricular or occipital adenopathy. Left side of head: Submandibular adenopathy present. No submental, tonsillar, preauricular, posterior auricular or occipital adenopathy. Neurological:      Mental Status: She is alert and oriented to person, place, and time. Psychiatric:         Behavior: Behavior is cooperative. ASSESSMENT and PLAN    Eileen Tejada was seen today for uri. Diagnoses and all orders for this visit:    Flu-like symptoms  Comments:  Conflicting test results but will send in Tamiflu in case it is early in the disease process to have adequate viral shedding for strong rapid positive result  Orders:  -     AMB POC SARS-COV-2 AND INFLUENZA A/B  -     AMB POC RAPID INFLUENZA TEST  -     oseltamivir (TAMIFLU) 75 MG capsule; Take 1 capsule by mouth 2 times daily for 5 days    Side effect of medication  Comments:  Zofran 15-30 minutes prior to taking tamiflu dose to offset potential side effect of the medication  Orders:  -     ondansetron (ZOFRAN ODT) 4 MG disintegrating tablet;  Take 1 tablet by mouth every 8 hours as needed for Nausea or Vomiting    Strep throat exposure  Comments:  Strep negative at this time  Orders:  -     AMB POC RAPID STREP A      Counseled on benefits of having a primary care provider which includes, but is not limited to, continuity of care and having a medical home when concerns arise. Also enforced that onsite clinic policy states that we are not to take the place of a primary care provider, pt verbalized understanding. SEs and risk vs benefits associated with medications prescribed discussed with patient who verbalized understanding. Pt verbalized understanding and agreement with plan of care. RTC for persisting/worsening symptoms or new complaints that arise. Discussed signs and symptoms that would warrant immediate evaluation including, but not limited to HA, blurred vision, speech disturbance, difficulty with ambulation/gait, numbness, tingling, weakness, syncope, chest pain, or shortness of breath. I have reviewed the patient's medication list, past medical, family, social, and surgical history in detail and updated the patient record appropriately.     Mahesh Jackson, CARLOS A - CNP

## 2022-12-19 ENCOUNTER — OFFICE VISIT (OUTPATIENT)
Dept: OCCUPATIONAL MEDICINE | Age: 32
End: 2022-12-19

## 2022-12-19 VITALS
RESPIRATION RATE: 16 BRPM | HEART RATE: 70 BPM | TEMPERATURE: 98.3 F | OXYGEN SATURATION: 99 % | SYSTOLIC BLOOD PRESSURE: 126 MMHG | DIASTOLIC BLOOD PRESSURE: 86 MMHG

## 2022-12-19 DIAGNOSIS — J31.0 RHINOSINUSITIS: Primary | ICD-10-CM

## 2022-12-19 DIAGNOSIS — J32.9 RHINOSINUSITIS: Primary | ICD-10-CM

## 2022-12-19 LAB
INFLUENZA A ANTIGEN, POC: NEGATIVE
INFLUENZA B ANTIGEN, POC: NEGATIVE
SARS-COV-2 RNA, POC: NEGATIVE

## 2022-12-19 ASSESSMENT — ENCOUNTER SYMPTOMS
SWOLLEN GLANDS: 0
EYE PAIN: 0
VOMITING: 0
DIARRHEA: 0
EYE REDNESS: 0
SORE THROAT: 0
COUGH: 1
ABDOMINAL PAIN: 0
NAUSEA: 0
WHEEZING: 0
EYE DISCHARGE: 0
RHINORRHEA: 0
EYE ITCHING: 0
SINUS PRESSURE: 1
SINUS PAIN: 0

## 2022-12-19 NOTE — PROGRESS NOTES
PROGRESS NOTE    SUBJECTIVE:   Clive Tello is a 33 y.o. female seen for some mild cold symptoms that started yesterday. Denies any known or suspected sick contacts    Chief Complaint    URI         URI   This is a new problem. The current episode started yesterday. The problem has been gradually worsening. There has been no fever. Associated symptoms include congestion and coughing. Pertinent negatives include no abdominal pain, chest pain, diarrhea, dysuria, ear pain, headaches, joint pain, joint swelling, nausea, neck pain, plugged ear sensation, rash, rhinorrhea, sinus pain, sneezing, sore throat, swollen glands, vomiting or wheezing. Treatments tried: pseudofed. The treatment provided mild relief. Current Outpatient Medications   Medication Sig Dispense Refill    fexofenadine (ALLEGRA) 180 MG tablet TAKE 1 TABLET BY MOUTH EVERY DAY FOR 30 DAYS SWALLOW WHOLE WITH WATER DO NOT TAKE WITH FRUIT JUICES      albuterol sulfate HFA (PROVENTIL;VENTOLIN;PROAIR) 108 (90 Base) MCG/ACT inhaler       montelukast (SINGULAIR) 10 MG tablet TAKE 1 TABLET BY MOUTH EVERY DAY FOR 30 DAYS      fluticasone (FLONASE) 50 MCG/ACT nasal spray 2 sprays by Nasal route daily as needed       No current facility-administered medications for this visit. Allergies   Allergen Reactions    Other Other (See Comments)     Tree and shrub pollen-sneezing             Review of Systems   Constitutional:  Positive for fatigue. Negative for chills and fever. HENT:  Positive for congestion and sinus pressure. Negative for ear pain, postnasal drip, rhinorrhea, sinus pain, sneezing and sore throat. Eyes:  Negative for pain, discharge, redness and itching. Respiratory:  Positive for cough. Negative for wheezing. Cardiovascular:  Negative for chest pain. Gastrointestinal:  Negative for abdominal pain, diarrhea, nausea and vomiting. Genitourinary:  Negative for dysuria.    Musculoskeletal:  Negative for arthralgias, joint pain, myalgias and neck pain. Skin:  Negative for rash. Neurological:  Negative for dizziness and headaches. OBJECTIVE:  /86 (Site: Left Upper Arm, Position: Sitting, Cuff Size: Medium Adult)   Pulse 70   Temp 98.3 °F (36.8 °C) (Oral)   Resp 16   SpO2 99%      Results for orders placed or performed in visit on 12/19/22   AMB POC SARS-COV-2 AND INFLUENZA A/B   Result Value Ref Range    SARS-COV-2 RNA, POC Negative     Influenza A Antigen, POC Negative Negative    Influenza B Antigen, POC Negative Negative       Physical Exam  Vitals reviewed. Constitutional:       General: She is awake. She is not in acute distress. Appearance: Normal appearance. She is well-developed and well-groomed. HENT:      Head: Normocephalic. Right Ear: Hearing, ear canal and external ear normal. No drainage, swelling or tenderness. A middle ear effusion is present. Left Ear: Hearing, ear canal and external ear normal. No drainage, swelling or tenderness. A middle ear effusion is present. Nose: Mucosal edema and rhinorrhea present. Rhinorrhea is clear. Right Turbinates: Swollen and pale. Left Turbinates: Swollen and pale. Right Sinus: No maxillary sinus tenderness or frontal sinus tenderness. Left Sinus: No maxillary sinus tenderness or frontal sinus tenderness. Mouth/Throat:      Mouth: Mucous membranes are moist.      Pharynx: Oropharynx is clear. Uvula midline. No pharyngeal swelling or posterior oropharyngeal erythema. Tonsils: No tonsillar exudate. Comments: PND with cobblestoning  Eyes:      General: Lids are normal. No allergic shiner. Conjunctiva/sclera: Conjunctivae normal.      Right eye: Right conjunctiva is not injected. No chemosis. Left eye: Left conjunctiva is not injected. No chemosis. Neck:      Thyroid: No thyromegaly. Trachea: Trachea normal.   Cardiovascular:      Rate and Rhythm: Normal rate and regular rhythm.       Heart sounds: Normal heart sounds. Pulmonary:      Effort: Pulmonary effort is normal.      Breath sounds: Normal breath sounds. Musculoskeletal:      Cervical back: Normal range of motion and neck supple. Lymphadenopathy:      Head:      Right side of head: No submental, submandibular, tonsillar, preauricular, posterior auricular or occipital adenopathy. Left side of head: No submental, submandibular, tonsillar, preauricular, posterior auricular or occipital adenopathy. Skin:     General: Skin is warm and dry. Neurological:      Mental Status: She is alert and oriented to person, place, and time. Psychiatric:         Behavior: Behavior is cooperative. ASSESSMENT and PLAN    Eileen Tejada was seen today for uri. Diagnoses and all orders for this visit:    Rhinosinusitis  Comments:  COVID/Flu negative at this time. Encouraged rest, hydration, and OTC therapies per packaging for relief. RTC as needed, may need retesting in 24-48°  Orders:  -     AMB POC SARS-COV-2 AND INFLUENZA A/B      Counseled on benefits of having a primary care provider which includes, but is not limited to, continuity of care and having a medical home when concerns arise. Also enforced that onsite clinic policy states that we are not to take the place of a primary care provider, pt verbalized understanding. SEs and risk vs benefits associated with medications prescribed discussed with patient who verbalized understanding. Pt verbalized understanding and agreement with plan of care. RTC for persisting/worsening symptoms or new complaints that arise. Discussed signs and symptoms that would warrant immediate evaluation including, but not limited to HA, blurred vision, speech disturbance, difficulty with ambulation/gait, numbness, tingling, weakness, syncope, chest pain, or shortness of breath.     I have reviewed the patient's medication list, past medical, family, social, and surgical history in detail and updated the patient record appropriately.     Carroll Bran, APRN - CNP

## 2022-12-20 ENCOUNTER — TELEPHONE (OUTPATIENT)
Dept: OCCUPATIONAL MEDICINE | Age: 32
End: 2022-12-20

## 2022-12-20 DIAGNOSIS — J31.0 RHINOSINUSITIS: Primary | ICD-10-CM

## 2022-12-20 DIAGNOSIS — J32.9 RHINOSINUSITIS: Primary | ICD-10-CM

## 2022-12-20 RX ORDER — AZITHROMYCIN 250 MG/1
250 TABLET, FILM COATED ORAL SEE ADMIN INSTRUCTIONS
Qty: 6 TABLET | Refills: 0 | Status: SHIPPED | OUTPATIENT
Start: 2022-12-20 | End: 2022-12-25

## 2022-12-20 NOTE — TELEPHONE ENCOUNTER
Patient contacted clinic to request a prescription for Azithromycin be sent in to her pharmacy as she wanted to have it available to ensure that she is not worsening over the extended holiday weekend. Discussed that antibiotics are not indicated for viral illness and to refrain from starting the antibiotic unless conservative measures are found to not provide any relief.  Also educated that IDSA guidelines do not recommend azithromycin for sinusitis and that she could experience treatment failure with this antibiotic choice, patient verbalized understanding and still desires to have azithromycin sent

## 2023-01-25 ENCOUNTER — OFFICE VISIT (OUTPATIENT)
Dept: OCCUPATIONAL MEDICINE | Age: 33
End: 2023-01-25

## 2023-01-25 VITALS
SYSTOLIC BLOOD PRESSURE: 134 MMHG | TEMPERATURE: 98.5 F | RESPIRATION RATE: 16 BRPM | OXYGEN SATURATION: 99 % | HEART RATE: 89 BPM | DIASTOLIC BLOOD PRESSURE: 90 MMHG

## 2023-01-25 DIAGNOSIS — J02.9 SORE THROAT: ICD-10-CM

## 2023-01-25 DIAGNOSIS — J32.9 RHINOSINUSITIS: Primary | ICD-10-CM

## 2023-01-25 DIAGNOSIS — J31.0 RHINOSINUSITIS: Primary | ICD-10-CM

## 2023-01-25 RX ORDER — FLUTICASONE FUROATE, UMECLIDINIUM BROMIDE AND VILANTEROL TRIFENATATE 100; 62.5; 25 UG/1; UG/1; UG/1
POWDER RESPIRATORY (INHALATION)
COMMUNITY
Start: 2023-01-12

## 2023-01-25 RX ORDER — TRIAMCINOLONE ACETONIDE 55 UG/1
2 SPRAY, METERED NASAL DAILY
Qty: 1 EACH | Refills: 3 | Status: SHIPPED | OUTPATIENT
Start: 2023-01-25

## 2023-01-25 ASSESSMENT — ENCOUNTER SYMPTOMS
SORE THROAT: 1
SWOLLEN GLANDS: 1
EYE REDNESS: 0
SINUS PAIN: 0
VOMITING: 0
WHEEZING: 0
EYE PAIN: 0
SINUS PRESSURE: 1
RHINORRHEA: 1
COUGH: 1
EYE ITCHING: 0
EYE DISCHARGE: 0
NAUSEA: 0
DIARRHEA: 0
ABDOMINAL PAIN: 0

## 2023-01-25 NOTE — PROGRESS NOTES
PROGRESS NOTE    SUBJECTIVE:   Herson Castro is a 27 y.o. female seen for some mild cold symptoms that started yesterday. Reports son recently diagnosed with a double ear infection    Chief Complaint    URI         URI   This is a new problem. The current episode started yesterday. The problem has been gradually worsening. There has been no fever. Associated symptoms include congestion, coughing, headaches, rhinorrhea, a sore throat and swollen glands. Pertinent negatives include no abdominal pain, chest pain, diarrhea, dysuria, ear pain, joint pain, joint swelling, nausea, neck pain, plugged ear sensation, rash, sinus pain, sneezing, vomiting or wheezing. She has tried NSAIDs (allegra, singulair, trelegy, OTC cold medication) for the symptoms. The treatment provided mild relief. Current Outpatient Medications   Medication Sig Dispense Refill    triamcinolone (NASACORT) 55 MCG/ACT nasal inhaler 2 sprays by Each Nostril route daily 1 each 3    TRELEGY ELLIPTA 100-62.5-25 MCG/ACT AEPB inhaler       fexofenadine (ALLEGRA) 180 MG tablet TAKE 1 TABLET BY MOUTH EVERY DAY FOR 30 DAYS SWALLOW WHOLE WITH WATER DO NOT TAKE WITH FRUIT JUICES      albuterol sulfate HFA (PROVENTIL;VENTOLIN;PROAIR) 108 (90 Base) MCG/ACT inhaler       montelukast (SINGULAIR) 10 MG tablet TAKE 1 TABLET BY MOUTH EVERY DAY FOR 30 DAYS       No current facility-administered medications for this visit. Allergies   Allergen Reactions    Other Other (See Comments)     Tree and shrub pollen-sneezing             Review of Systems   Constitutional:  Positive for fatigue. Negative for chills and fever. HENT:  Positive for congestion, rhinorrhea, sinus pressure and sore throat. Negative for ear pain, postnasal drip, sinus pain and sneezing. Eyes:  Negative for pain, discharge, redness and itching. Respiratory:  Positive for cough. Negative for wheezing. Cardiovascular:  Negative for chest pain.    Gastrointestinal:  Negative for abdominal pain, diarrhea, nausea and vomiting. Genitourinary:  Negative for dysuria. Musculoskeletal:  Positive for myalgias. Negative for arthralgias, joint pain and neck pain. Skin:  Negative for rash. Neurological:  Positive for headaches. Negative for dizziness. OBJECTIVE:  BP (!) 134/90 (Site: Left Upper Arm, Position: Sitting, Cuff Size: Medium Adult)   Pulse 89   Temp 98.5 °F (36.9 °C) (Oral)   Resp 16   SpO2 99%      Results for orders placed or performed in visit on 01/25/23   AMB POC SARS-COV-2 AND INFLUENZA A/B   Result Value Ref Range    SARS-COV-2 RNA, POC Negative     Influenza A Antigen, POC Negative Negative    Influenza B Antigen, POC Negative Negative   AMB POC RAPID STREP A   Result Value Ref Range    Valid Internal Control, POC yes     Group A Strep Antigen, POC Negative Negative       Physical Exam  Vitals reviewed. Constitutional:       General: She is awake. She is not in acute distress. Appearance: Normal appearance. She is well-developed and well-groomed. HENT:      Head: Normocephalic. Right Ear: Hearing, ear canal and external ear normal. No drainage, swelling or tenderness. A middle ear effusion is present. Left Ear: Hearing, ear canal and external ear normal. No drainage, swelling or tenderness. A middle ear effusion is present. Nose: Mucosal edema and rhinorrhea present. Rhinorrhea is clear. Right Turbinates: Swollen and pale. Left Turbinates: Swollen and pale. Right Sinus: No maxillary sinus tenderness or frontal sinus tenderness. Left Sinus: No maxillary sinus tenderness or frontal sinus tenderness. Mouth/Throat:      Mouth: Mucous membranes are moist.      Pharynx: Oropharynx is clear. Uvula midline. No pharyngeal swelling or posterior oropharyngeal erythema. Tonsils: No tonsillar exudate. Comments: PND with cobblestoning  Eyes:      General: Lids are normal. No allergic shiner.      Conjunctiva/sclera: Conjunctivae normal.      Right eye: Right conjunctiva is not injected. No chemosis. Left eye: Left conjunctiva is not injected. No chemosis. Neck:      Thyroid: No thyromegaly. Trachea: Trachea normal.   Cardiovascular:      Rate and Rhythm: Normal rate and regular rhythm. Heart sounds: Normal heart sounds. Pulmonary:      Effort: Pulmonary effort is normal.      Breath sounds: Normal breath sounds. Musculoskeletal:      Cervical back: Normal range of motion and neck supple. Lymphadenopathy:      Head:      Right side of head: No submental, submandibular, tonsillar, preauricular, posterior auricular or occipital adenopathy. Left side of head: No submental, submandibular, tonsillar, preauricular, posterior auricular or occipital adenopathy. Skin:     General: Skin is warm and dry. Neurological:      Mental Status: She is alert and oriented to person, place, and time. Psychiatric:         Behavior: Behavior is cooperative. ASSESSMENT and PLAN    Jensen Nugent was seen today for uri. Diagnoses and all orders for this visit:    Rhinosinusitis  Comments:  COVID/Flu/strep negative at this time. Encouraged rest, hydration, and OTC therapies per packaging for relief. RTC as needed, may need retesting in 24-48°  Orders:  -     AMB POC SARS-COV-2 AND INFLUENZA A/B  -     triamcinolone (NASACORT) 55 MCG/ACT nasal inhaler; 2 sprays by Each Nostril route daily    Sore throat  Comments:  Encourage hydration, voice rest, warm salt water gargles, and OTC NSAIDs per packaging for relief  Orders:  -     AMB POC RAPID STREP A      Counseled on benefits of having a primary care provider which includes, but is not limited to, continuity of care and having a medical home when concerns arise. Also enforced that onsite clinic policy states that we are not to take the place of a primary care provider, pt verbalized understanding.      SEs and risk vs benefits associated with medications prescribed discussed with patient who verbalized understanding. Pt verbalized understanding and agreement with plan of care. RTC for persisting/worsening symptoms or new complaints that arise. Discussed signs and symptoms that would warrant immediate evaluation including, but not limited to HA, blurred vision, speech disturbance, difficulty with ambulation/gait, numbness, tingling, weakness, syncope, chest pain, or shortness of breath. I have reviewed the patient's medication list, past medical, family, social, and surgical history in detail and updated the patient record appropriately.     Shalonda Morin, APRN - CNP

## 2023-01-31 ENCOUNTER — OFFICE VISIT (OUTPATIENT)
Dept: OCCUPATIONAL MEDICINE | Age: 33
End: 2023-01-31

## 2023-01-31 VITALS
TEMPERATURE: 98.4 F | RESPIRATION RATE: 16 BRPM | HEART RATE: 76 BPM | SYSTOLIC BLOOD PRESSURE: 108 MMHG | OXYGEN SATURATION: 99 % | DIASTOLIC BLOOD PRESSURE: 84 MMHG

## 2023-01-31 DIAGNOSIS — J31.0 RHINOSINUSITIS: Primary | ICD-10-CM

## 2023-01-31 DIAGNOSIS — H10.33 ACUTE BACTERIAL CONJUNCTIVITIS OF BOTH EYES: ICD-10-CM

## 2023-01-31 DIAGNOSIS — J32.9 RHINOSINUSITIS: Primary | ICD-10-CM

## 2023-01-31 RX ORDER — AMOXICILLIN AND CLAVULANATE POTASSIUM 875; 125 MG/1; MG/1
1 TABLET, FILM COATED ORAL 2 TIMES DAILY
Qty: 10 TABLET | Refills: 0 | Status: SHIPPED | OUTPATIENT
Start: 2023-01-31 | End: 2023-02-05

## 2023-01-31 RX ORDER — POLYMYXIN B SULFATE AND TRIMETHOPRIM 1; 10000 MG/ML; [USP'U]/ML
1 SOLUTION OPHTHALMIC EVERY 6 HOURS
Qty: 10 ML | Refills: 0 | Status: SHIPPED | OUTPATIENT
Start: 2023-01-31 | End: 2023-02-07

## 2023-01-31 RX ORDER — PREDNISONE 5 MG/1
TABLET ORAL
Qty: 1 EACH | Refills: 0 | Status: SHIPPED | OUTPATIENT
Start: 2023-01-31

## 2023-01-31 ASSESSMENT — ENCOUNTER SYMPTOMS
SWOLLEN GLANDS: 0
EYE PAIN: 1
NAUSEA: 0
DOUBLE VISION: 0
SHORTNESS OF BREATH: 0
EYE ITCHING: 1
SINUS PRESSURE: 1
STRIDOR: 0
COUGH: 1
SORE THROAT: 0
EYE DISCHARGE: 1
HOARSE VOICE: 0
EYE REDNESS: 1
DIARRHEA: 0
CHEST TIGHTNESS: 0
RHINORRHEA: 1
SINUS PAIN: 1
PHOTOPHOBIA: 0
VOMITING: 0

## 2023-01-31 ASSESSMENT — VISUAL ACUITY: OU: 1

## 2023-01-31 NOTE — PROGRESS NOTES
PROGRESS NOTE    SUBJECTIVE:   Yodit Steinberg is a 27 y.o. female seen for persisting sinus infection symptoms and new onset of \"pink eye\" involving both of her eyes that started yesterday. Reports she is still taking OTC cold medication and allergy medications for her sinus symptoms with no improvement and that she has used OTC moisturizing drops as well as OTC Pataday allergy drops with no improvement in her eye symptoms. Chief Complaint    Conjunctivitis; Sinusitis         Conjunctivitis   The current episode started 2 days ago. The onset was sudden. The problem has been gradually worsening. The problem is moderate. Nothing relieves the symptoms. Nothing aggravates the symptoms. Associated symptoms include eye itching, congestion, headaches, rhinorrhea, cough, eye discharge, eye pain and eye redness. Pertinent negatives include no fever, no decreased vision, no double vision, no photophobia, no diarrhea, no nausea, no vomiting, no ear discharge, no ear pain, no hearing loss, no mouth sores, no sore throat, no stridor, no swollen glands and no neck pain. The eye pain is moderate. Both eyes are affected. The eye pain is not associated with movement. The eyelid exhibits redness. Sinusitis  This is a recurrent problem. The current episode started 1 to 4 weeks ago. The problem has been gradually worsening since onset. There has been no fever. The pain is mild. Associated symptoms include congestion, coughing, headaches and sinus pressure. Pertinent negatives include no chills, diaphoresis, ear pain, hoarse voice, neck pain, shortness of breath, sneezing, sore throat or swollen glands. Treatments tried: OTC cold medications, allegra, singulair, nasacort. The treatment provided mild relief.      Current Outpatient Medications   Medication Sig Dispense Refill    amoxicillin-clavulanate (AUGMENTIN) 875-125 MG per tablet Take 1 tablet by mouth 2 times daily for 5 days 10 tablet 0    predniSONE 5 MG (21) TBPK take as directed by 5 mg dose pack administration instructions 1 each 0    trimethoprim-polymyxin b (POLYTRIM) 68674-4.1 UNIT/ML-% ophthalmic solution Place 1 drop into both eyes in the morning and 1 drop at noon and 1 drop in the evening and 1 drop before bedtime. Do all this for 7 days. 10 mL 0    TRELEGY ELLIPTA 100-62.5-25 MCG/ACT AEPB inhaler       triamcinolone (NASACORT) 55 MCG/ACT nasal inhaler 2 sprays by Each Nostril route daily 1 each 3    fexofenadine (ALLEGRA) 180 MG tablet TAKE 1 TABLET BY MOUTH EVERY DAY FOR 30 DAYS SWALLOW WHOLE WITH WATER DO NOT TAKE WITH FRUIT JUICES      albuterol sulfate HFA (PROVENTIL;VENTOLIN;PROAIR) 108 (90 Base) MCG/ACT inhaler       montelukast (SINGULAIR) 10 MG tablet TAKE 1 TABLET BY MOUTH EVERY DAY FOR 30 DAYS       No current facility-administered medications for this visit. Allergies   Allergen Reactions    Other Other (See Comments)     Tree and shrub pollen-sneezing             Review of Systems   Constitutional:  Positive for fatigue. Negative for chills, diaphoresis and fever. HENT:  Positive for congestion, postnasal drip, rhinorrhea, sinus pressure and sinus pain. Negative for ear discharge, ear pain, hearing loss, hoarse voice, mouth sores, sneezing and sore throat. Eyes:  Positive for pain, discharge, redness and itching. Negative for double vision and photophobia. Respiratory:  Positive for cough. Negative for chest tightness, shortness of breath and stridor. Cardiovascular:  Negative for chest pain. Gastrointestinal:  Negative for diarrhea, nausea and vomiting. Musculoskeletal:  Negative for arthralgias, myalgias and neck pain. Allergic/Immunologic: Positive for environmental allergies. Neurological:  Positive for headaches. Negative for dizziness and light-headedness.         OBJECTIVE:  /84 (Site: Left Upper Arm, Position: Sitting, Cuff Size: Medium Adult)   Pulse 76   Temp 98.4 °F (36.9 °C) (Oral)   Resp 16   SpO2 99%      No results found for this visit on 01/31/23. Physical Exam  Vitals reviewed. Constitutional:       General: She is awake. She is not in acute distress. Appearance: Normal appearance. She is well-developed and well-groomed. HENT:      Head: Normocephalic. Right Ear: Hearing and external ear normal. A middle ear effusion is present. Left Ear: Hearing and external ear normal. A middle ear effusion is present. Nose: Mucosal edema present. Right Turbinates: Swollen. Left Turbinates: Swollen. Right Sinus: Maxillary sinus tenderness present. No frontal sinus tenderness. Left Sinus: Maxillary sinus tenderness present. No frontal sinus tenderness. Mouth/Throat:      Lips: Pink. No lesions. Mouth: Mucous membranes are moist.      Pharynx: Uvula midline. No pharyngeal swelling or posterior oropharyngeal erythema. Tonsils: No tonsillar exudate. Comments: PND with cobblestoning  Eyes:      General: Lids are normal. Lids are everted, no foreign bodies appreciated. Vision grossly intact. Right eye: No foreign body, discharge or hordeolum. Left eye: No foreign body, discharge or hordeolum. Extraocular Movements:      Right eye: Normal extraocular motion. Left eye: Normal extraocular motion. Conjunctiva/sclera:      Right eye: Right conjunctiva is injected. No exudate. Left eye: Left conjunctiva is injected. No exudate. Neck:      Trachea: No tracheal deviation. Cardiovascular:      Rate and Rhythm: Normal rate and regular rhythm. Heart sounds: Normal heart sounds. Pulmonary:      Effort: Pulmonary effort is normal.      Breath sounds: Normal breath sounds. Musculoskeletal:      Cervical back: Neck supple. Lymphadenopathy:      Head:      Right side of head: Submandibular adenopathy present. No submental, tonsillar, preauricular, posterior auricular or occipital adenopathy. Left side of head: Submandibular adenopathy present. No submental, tonsillar, preauricular, posterior auricular or occipital adenopathy. Neurological:      Mental Status: She is alert and oriented to person, place, and time. Psychiatric:         Behavior: Behavior is cooperative. ASSESSMENT and PLAN    Ailyn Potter was seen today for conjunctivitis and sinusitis. Diagnoses and all orders for this visit:    Rhinosinusitis  Comments:  Augmentin and prednisone as prescribed with food. Conintue current allergy regimen and encouraged saline sinus rinses for additional relief. RTC as needed  Orders:  -     amoxicillin-clavulanate (AUGMENTIN) 875-125 MG per tablet; Take 1 tablet by mouth 2 times daily for 5 days  -     predniSONE 5 MG (21) TBPK; take as directed by 5 mg dose pack administration instructions    Acute bacterial conjunctivitis of both eyes  Comments:  Polytrim as prescribed. Instructed pt to refrain from wearing eye make up while under treatment and to buy new products as old eye makeup can reinfect her eyes   Orders:  -     trimethoprim-polymyxin b (POLYTRIM) 08929-1.1 UNIT/ML-% ophthalmic solution; Place 1 drop into both eyes in the morning and 1 drop at noon and 1 drop in the evening and 1 drop before bedtime. Do all this for 7 days. Counseled on benefits of having a primary care provider which includes, but is not limited to, continuity of care and having a medical home when concerns arise. Also enforced that onsite clinic policy states that we are not to take the place of a primary care provider, pt verbalized understanding. SEs and risk vs benefits associated with medications prescribed discussed with patient who verbalized understanding. Pt verbalized understanding and agreement with plan of care. RTC for persisting/worsening symptoms or new complaints that arise.  Discussed signs and symptoms that would warrant immediate evaluation including, but not limited to HA, blurred vision, speech disturbance, difficulty with ambulation/gait, numbness, tingling, weakness, syncope, chest pain, or shortness of breath.    I have reviewed the patient's medication list, past medical, family, social, and surgical history in detail and updated the patient record appropriately.    Jim Ariza, APRN - CNP

## 2023-03-15 ENCOUNTER — TELEPHONE (OUTPATIENT)
Dept: OCCUPATIONAL MEDICINE | Age: 33
End: 2023-03-15

## 2023-05-30 ENCOUNTER — OFFICE VISIT (OUTPATIENT)
Dept: OCCUPATIONAL MEDICINE | Age: 33
End: 2023-05-30

## 2023-05-30 VITALS
OXYGEN SATURATION: 99 % | HEART RATE: 85 BPM | RESPIRATION RATE: 16 BRPM | DIASTOLIC BLOOD PRESSURE: 80 MMHG | TEMPERATURE: 98.1 F | SYSTOLIC BLOOD PRESSURE: 122 MMHG

## 2023-05-30 DIAGNOSIS — J32.9 RHINOSINUSITIS: ICD-10-CM

## 2023-05-30 DIAGNOSIS — J10.1 INFLUENZA A: Primary | ICD-10-CM

## 2023-05-30 DIAGNOSIS — J31.0 RHINOSINUSITIS: ICD-10-CM

## 2023-05-30 LAB
GROUP A STREP ANTIGEN, POC: NEGATIVE
INFLUENZA A ANTIGEN, POC: POSITIVE
INFLUENZA B ANTIGEN, POC: NEGATIVE
SARS-COV-2 RNA, POC: NEGATIVE
VALID INTERNAL CONTROL, POC: YES

## 2023-05-30 RX ORDER — OSELTAMIVIR PHOSPHATE 75 MG/1
75 CAPSULE ORAL 2 TIMES DAILY
Qty: 10 CAPSULE | Refills: 0 | Status: SHIPPED | OUTPATIENT
Start: 2023-05-30 | End: 2023-06-04

## 2023-05-30 ASSESSMENT — ENCOUNTER SYMPTOMS
SWOLLEN GLANDS: 1
SINUS PRESSURE: 1
SINUS PAIN: 1
EYE REDNESS: 0
EYE ITCHING: 0
EYE PAIN: 0
VOMITING: 0
SORE THROAT: 1
ABDOMINAL PAIN: 0
NAUSEA: 0
EYE DISCHARGE: 0
COUGH: 0
RHINORRHEA: 1
WHEEZING: 0
DIARRHEA: 0

## 2023-05-30 NOTE — PROGRESS NOTES
PROGRESS NOTE    SUBJECTIVE:   Kip East is a 27 y.o. female seen for mild cold symptoms that started 3-4 days ago while traveling to [place], [place] and [place] to go to [facility]. Chief Complaint    URI         URI   This is a new problem. Episode onset: 3-4 days. The problem has been unchanged. There has been no fever. Associated symptoms include congestion, ear pain, a plugged ear sensation, rhinorrhea, sinus pain, a sore throat and swollen glands. Pertinent negatives include no abdominal pain, chest pain, coughing, diarrhea, dysuria, headaches, joint pain, joint swelling, nausea, neck pain, rash, sneezing, vomiting or wheezing. Treatments tried: zicam, theraflu. tylneol PM, montelukast, fexofenadine. The treatment provided mild relief. Current Outpatient Medications   Medication Sig Dispense Refill    oseltamivir (TAMIFLU) 75 MG capsule Take 1 capsule by mouth 2 times daily for 5 days 10 capsule 0    LOTEMAX SM 0.38 % GEL PLACE 1 DROP INTO LEFT EYE 3 TIMES A DAY X 2 WEEKS THEN STOP      TRELEGY ELLIPTA 100-62.5-25 MCG/ACT AEPB inhaler       triamcinolone (NASACORT) 55 MCG/ACT nasal inhaler 2 sprays by Each Nostril route daily 1 each 3    fexofenadine (ALLEGRA) 180 MG tablet TAKE 1 TABLET BY MOUTH EVERY DAY FOR 30 DAYS SWALLOW WHOLE WITH WATER DO NOT TAKE WITH FRUIT JUICES      albuterol sulfate HFA (PROVENTIL;VENTOLIN;PROAIR) 108 (90 Base) MCG/ACT inhaler       montelukast (SINGULAIR) 10 MG tablet TAKE 1 TABLET BY MOUTH EVERY DAY FOR 30 DAYS       No current facility-administered medications for this visit. Allergies   Allergen Reactions    Other Other (See Comments)     Tree and shrub pollen-sneezing             Review of Systems   Constitutional:  Positive for fatigue. Negative for chills and fever. HENT:  Positive for congestion, ear pain, postnasal drip, rhinorrhea, sinus pressure, sinus pain and sore throat. Negative for sneezing. Eyes:  Negative for pain, discharge, redness and itching.

## 2023-08-25 ENCOUNTER — OFFICE VISIT (OUTPATIENT)
Dept: OCCUPATIONAL MEDICINE | Age: 33
End: 2023-08-25

## 2023-08-25 VITALS
TEMPERATURE: 98.2 F | OXYGEN SATURATION: 99 % | SYSTOLIC BLOOD PRESSURE: 122 MMHG | DIASTOLIC BLOOD PRESSURE: 80 MMHG | HEART RATE: 78 BPM | RESPIRATION RATE: 16 BRPM

## 2023-08-25 DIAGNOSIS — J31.0 RHINOSINUSITIS: ICD-10-CM

## 2023-08-25 DIAGNOSIS — J32.9 RHINOSINUSITIS: ICD-10-CM

## 2023-08-25 DIAGNOSIS — J02.0 STREP PHARYNGITIS: Primary | ICD-10-CM

## 2023-08-25 PROBLEM — H52.10 MYOPIA: Status: ACTIVE | Noted: 2021-02-03

## 2023-08-25 PROBLEM — H52.203 ASTIGMATISM OF BOTH EYES: Status: ACTIVE | Noted: 2021-02-03

## 2023-08-25 LAB
GROUP A STREP ANTIGEN, POC: POSITIVE
INFLUENZA A ANTIGEN, POC: NEGATIVE
INFLUENZA B ANTIGEN, POC: NEGATIVE
SARS-COV-2 RNA, POC: NEGATIVE
VALID INTERNAL CONTROL, POC: YES

## 2023-08-25 RX ORDER — AZITHROMYCIN 250 MG/1
250 TABLET, FILM COATED ORAL SEE ADMIN INSTRUCTIONS
Qty: 6 TABLET | Refills: 0 | Status: SHIPPED | OUTPATIENT
Start: 2023-08-25 | End: 2023-08-30

## 2023-08-25 RX ORDER — FEXOFENADINE HCL AND PSEUDOEPHEDRINE HCI 60; 120 MG/1; MG/1
TABLET, EXTENDED RELEASE ORAL
COMMUNITY

## 2023-08-25 ASSESSMENT — ENCOUNTER SYMPTOMS
EYE PAIN: 0
EYE ITCHING: 0
WHEEZING: 0
SINUS PRESSURE: 0
DIARRHEA: 0
SORE THROAT: 1
NAUSEA: 0
EYE REDNESS: 0
COUGH: 0
SINUS PAIN: 0
EYE DISCHARGE: 0
SWOLLEN GLANDS: 1
VOMITING: 0
ABDOMINAL PAIN: 0
RHINORRHEA: 1

## 2023-08-25 NOTE — PATIENT INSTRUCTIONS
information? Your pharmacist can provide more information about azithromycin. Remember, keep this and all other medicines out of the reach of children, never share your medicines with others, and use this medication only for the indication prescribed. Every effort has been made to ensure that the information provided by [address] [place] [address] is accurate, up-to-date, and complete, but no guarantee is made to that effect. Drug information contained herein may be time sensitive. [facility] information has been compiled for use by healthcare practitioners and consumers in the [facility] and therefore [facility] does not warrant that uses outside of the [facility] are appropriate, unless specifically indicated otherwise. [facility]'s drug information does not endorse drugs, diagnose patients or recommend therapy. [facility] drug information is an informational resource designed to assist licensed healthcare practitioners in caring for their patients and/or to serve consumers viewing this service as a supplement to, and not a substitute for, the expertise, skill, knowledge and judgment of healthcare practitioners. The absence of a warning for a given drug or drug combination in no way should be construed to indicate that the drug or drug combination is safe, effective or appropriate for any given patient. [facility] does not assume any responsibility for any aspect of healthcare administered with the aid of information [facility] provides. The information contained herein is not intended to cover all possible uses, directions, precautions, warnings, drug interactions, allergic reactions, or adverse effects. If you have questions about the drugs you are taking, check with your doctor, nurse or pharmacist.  Copyright 5580-8091 [address]: 18.01. Revision date: 5/2/2019. Care instructions adapted under license by [facility] ([facility]).  If you have questions about a medical condition or this instruction, always ask your

## 2023-08-25 NOTE — PROGRESS NOTES
PROGRESS NOTE    SUBJECTIVE:   Petra Serrato is a 27 y.o. female seen for mild cold symptoms that started last night. Reports she had close contact with someone Tuesday that thought they \"had a cold\" then tested positive for COVID the next day    Chief Complaint    URI         URI   This is a new problem. The current episode started yesterday. The problem has been gradually worsening. There has been no fever. Associated symptoms include congestion, rhinorrhea, a sore throat and swollen glands. Pertinent negatives include no abdominal pain, chest pain, coughing, diarrhea, dysuria, ear pain, headaches, joint pain, joint swelling, nausea, neck pain, plugged ear sensation, rash, sinus pain, sneezing, vomiting or wheezing. She has tried acetaminophen and antihistamine (nasacort) for the symptoms. The treatment provided mild relief. Current Outpatient Medications   Medication Sig Dispense Refill    fexofenadine-pseudoephedrine (ALLEGRA-D 12HR)  MG per extended release tablet Take by mouth      azithromycin (ZITHROMAX) 250 MG tablet Take 1 tablet by mouth See Admin Instructions for 5 days 500mg on day 1 followed by 250mg on days 2 - 5 6 tablet 0    LOTEMAX SM 0.38 % GEL PLACE 1 DROP INTO LEFT EYE 3 TIMES A DAY X 2 WEEKS THEN STOP      TRELEGY ELLIPTA 100-62.5-25 MCG/ACT AEPB inhaler       triamcinolone (NASACORT) 55 MCG/ACT nasal inhaler 2 sprays by Each Nostril route daily 1 each 3    fexofenadine (ALLEGRA) 180 MG tablet TAKE 1 TABLET BY MOUTH EVERY DAY FOR 30 DAYS SWALLOW WHOLE WITH WATER DO NOT TAKE WITH FRUIT JUICES      albuterol sulfate HFA (PROVENTIL;VENTOLIN;PROAIR) 108 (90 Base) MCG/ACT inhaler       montelukast (SINGULAIR) 10 MG tablet TAKE 1 TABLET BY MOUTH EVERY DAY FOR 30 DAYS       No current facility-administered medications for this visit.       Allergies   Allergen Reactions    Other Other (See Comments)     Tree and shrub pollen-sneezing             Review of Systems   Constitutional:  Positive

## 2023-10-02 ENCOUNTER — OFFICE VISIT (OUTPATIENT)
Dept: OCCUPATIONAL MEDICINE | Age: 33
End: 2023-10-02

## 2023-10-02 VITALS
TEMPERATURE: 98.5 F | SYSTOLIC BLOOD PRESSURE: 110 MMHG | RESPIRATION RATE: 16 BRPM | OXYGEN SATURATION: 99 % | DIASTOLIC BLOOD PRESSURE: 90 MMHG | HEART RATE: 67 BPM

## 2023-10-02 DIAGNOSIS — J02.0 STREP PHARYNGITIS: ICD-10-CM

## 2023-10-02 DIAGNOSIS — J10.1 INFLUENZA A: Primary | ICD-10-CM

## 2023-10-02 DIAGNOSIS — R68.89 FLU-LIKE SYMPTOMS: ICD-10-CM

## 2023-10-02 DIAGNOSIS — B37.9 ANTIBIOTIC-INDUCED YEAST INFECTION: ICD-10-CM

## 2023-10-02 DIAGNOSIS — T36.95XA ANTIBIOTIC-INDUCED YEAST INFECTION: ICD-10-CM

## 2023-10-02 RX ORDER — OSELTAMIVIR PHOSPHATE 75 MG/1
75 CAPSULE ORAL 2 TIMES DAILY
Qty: 10 CAPSULE | Refills: 0 | Status: SHIPPED | OUTPATIENT
Start: 2023-10-02 | End: 2023-10-07

## 2023-10-02 RX ORDER — FLUCONAZOLE 150 MG/1
150 TABLET ORAL
Qty: 2 TABLET | Refills: 0 | Status: SHIPPED | OUTPATIENT
Start: 2023-10-02 | End: 2023-10-08

## 2023-10-02 RX ORDER — AMOXICILLIN 500 MG/1
500 CAPSULE ORAL 2 TIMES DAILY
Qty: 20 CAPSULE | Refills: 0 | Status: SHIPPED | OUTPATIENT
Start: 2023-10-02 | End: 2023-10-12

## 2023-10-02 ASSESSMENT — ENCOUNTER SYMPTOMS
CONSTIPATION: 0
SORE THROAT: 1
SINUS PAIN: 0
COUGH: 0
EYE PAIN: 0
VOMITING: 0
NAUSEA: 0
SHORTNESS OF BREATH: 0
CHEST TIGHTNESS: 0
SINUS PRESSURE: 0
SWOLLEN GLANDS: 1
WHEEZING: 0
EYE ITCHING: 0
EYE REDNESS: 0
DIARRHEA: 0
RHINORRHEA: 0
EYE DISCHARGE: 0

## 2023-10-02 NOTE — PROGRESS NOTES
seen today for uri. Diagnoses and all orders for this visit:    Influenza A  Comments:  Tamiflu and amoxicillan with food as instructed. OTC therapies as needed. Orders:  -     oseltamivir (TAMIFLU) 75 MG capsule; Take 1 capsule by mouth 2 times daily for 5 days    Strep pharyngitis  Comments:  Tamiflu and amoxicillin with food as instructed. OTC therapies as needed. Orders:  -     amoxicillin (AMOXIL) 500 MG capsule; Take 1 capsule by mouth 2 times daily for 10 days    Antibiotic-induced yeast infection  Comments:  Diflucan as prescribed if needed. Orders:  -     fluconazole (DIFLUCAN) 150 MG tablet; Take 1 tablet by mouth every 72 hours for 6 days    Flu-like symptoms  -     AMB POC SARS-COV-2 AND INFLUENZA A/B  -     AMB POC RAPID STREP A        Counseled on benefits of having a primary care provider which includes, but is not limited to, continuity of care and having a medical home when concerns arise. Also enforced that onsite clinic policy states that we are not to take the place of a primary care provider, pt verbalized understanding. SEs and risk vs benefits associated with medications prescribed discussed with patient who verbalized understanding. Pt verbalized understanding and agreement with plan of care. RTC for persisting/worsening symptoms or new complaints that arise. Discussed signs and symptoms that would warrant immediate evaluation including, but not limited to HA, blurred vision, speech disturbance, difficulty with ambulation/gait, numbness, tingling, weakness, syncope, chest pain, or shortness of breath. I have reviewed the patient's medication list, past medical, family, social, and surgical history in detail and updated the patient record appropriately.     CARLOS A Palacio - YOLIE

## 2023-11-13 ENCOUNTER — OFFICE VISIT (OUTPATIENT)
Dept: OCCUPATIONAL MEDICINE | Age: 33
End: 2023-11-13

## 2023-11-13 VITALS
RESPIRATION RATE: 16 BRPM | OXYGEN SATURATION: 99 % | TEMPERATURE: 98.2 F | HEART RATE: 60 BPM | DIASTOLIC BLOOD PRESSURE: 72 MMHG | SYSTOLIC BLOOD PRESSURE: 120 MMHG

## 2023-11-13 DIAGNOSIS — J02.9 SORE THROAT: Primary | ICD-10-CM

## 2023-11-13 ASSESSMENT — ENCOUNTER SYMPTOMS
ABDOMINAL PAIN: 0
COUGH: 0
EYE ITCHING: 0
RHINORRHEA: 0
EYE PAIN: 0
SORE THROAT: 1
VOMITING: 0
NAUSEA: 0
SINUS PRESSURE: 0
SWOLLEN GLANDS: 1
SINUS PAIN: 0
EYE REDNESS: 0
EYE DISCHARGE: 0
CHANGE IN BOWEL HABIT: 0
VISUAL CHANGE: 0

## 2023-11-13 NOTE — PROGRESS NOTES
PROGRESS NOTE    SUBJECTIVE:   Shalonda Kay is a 27 y.o. female seen for sore throat that started yesterday. Reports she was at [facility] this weekend     Chief Complaint    Pharyngitis         Pharyngitis  This is a new problem. The current episode started yesterday. The problem has been waxing and waning. Associated symptoms include congestion, fatigue, headaches, a sore throat and swollen glands. Pertinent negatives include no abdominal pain, anorexia, arthralgias, change in bowel habit, chest pain, chills, coughing, diaphoresis, fever, joint swelling, myalgias, nausea, neck pain, numbness, rash, urinary symptoms, vertigo, visual change, vomiting or weakness. The symptoms are aggravated by drinking, eating and swallowing. She has tried sleep (Excedrin) for the symptoms. The treatment provided moderate relief. Current Outpatient Medications   Medication Sig Dispense Refill    fexofenadine-pseudoephedrine (ALLEGRA-D 12HR)  MG per extended release tablet Take by mouth      LOTEMAX SM 0.38 % GEL PLACE 1 DROP INTO LEFT EYE 3 TIMES A DAY X 2 WEEKS THEN STOP      TRELEGY ELLIPTA 100-62.5-25 MCG/ACT AEPB inhaler       triamcinolone (NASACORT) 55 MCG/ACT nasal inhaler 2 sprays by Each Nostril route daily 1 each 3    fexofenadine (ALLEGRA) 180 MG tablet TAKE 1 TABLET BY MOUTH EVERY DAY FOR 30 DAYS SWALLOW WHOLE WITH WATER DO NOT TAKE WITH FRUIT JUICES      albuterol sulfate HFA (PROVENTIL;VENTOLIN;PROAIR) 108 (90 Base) MCG/ACT inhaler       montelukast (SINGULAIR) 10 MG tablet TAKE 1 TABLET BY MOUTH EVERY DAY FOR 30 DAYS       No current facility-administered medications for this visit. Allergies   Allergen Reactions    Other Other (See Comments)     Tree and shrub pollen-sneezing       Social History     Tobacco Use    Smoking status: Never        Review of Systems   Constitutional:  Positive for fatigue. Negative for chills, diaphoresis and fever. HENT:  Positive for congestion and sore throat.

## 2024-02-02 ENCOUNTER — OFFICE VISIT (OUTPATIENT)
Age: 34
End: 2024-02-02

## 2024-02-02 VITALS
DIASTOLIC BLOOD PRESSURE: 72 MMHG | HEART RATE: 74 BPM | RESPIRATION RATE: 16 BRPM | SYSTOLIC BLOOD PRESSURE: 124 MMHG | OXYGEN SATURATION: 99 % | TEMPERATURE: 98.5 F

## 2024-02-02 DIAGNOSIS — J32.9 RHINOSINUSITIS: Primary | ICD-10-CM

## 2024-02-02 ASSESSMENT — ENCOUNTER SYMPTOMS
RHINORRHEA: 1
SINUS PAIN: 0
EYE PAIN: 0
EYE ITCHING: 0
EYE REDNESS: 0
NAUSEA: 0
ABDOMINAL PAIN: 0
WHEEZING: 0
VOMITING: 0
SORE THROAT: 0
SWOLLEN GLANDS: 0
DIARRHEA: 0
COUGH: 0
SINUS PRESSURE: 1
EYE DISCHARGE: 0

## 2024-02-02 NOTE — PROGRESS NOTES
Eyes:  Negative for pain, discharge, redness and itching.   Respiratory:  Negative for cough and wheezing.    Cardiovascular:  Negative for chest pain.   Gastrointestinal:  Negative for abdominal pain, diarrhea, nausea and vomiting.   Genitourinary:  Negative for dysuria.   Musculoskeletal:  Negative for arthralgias, joint pain, myalgias and neck pain.   Skin:  Negative for rash.   Allergic/Immunologic: Positive for environmental allergies.   Neurological:  Negative for dizziness, light-headedness and headaches.          OBJECTIVE:  /72 (Site: Left Upper Arm, Position: Sitting, Cuff Size: Medium Adult)   Pulse 74   Temp 98.5 °F (36.9 °C) (Oral)   Resp 16   SpO2 99%      Results for orders placed or performed in visit on 02/02/24   AMB POC RAPID STREP A   Result Value Ref Range    Valid Internal Control, POC yes     Group A Strep Antigen, POC Negative    AMB POC SARS-COV-2 AND INFLUENZA A/B   Result Value Ref Range    SARS-COV-2 RNA, POC Negative     Influenza A Antigen, POC Negative     Influenza B Antigen, POC Negative        Physical Exam  Vitals reviewed.   Constitutional:       General: She is awake. She is not in acute distress.     Appearance: Normal appearance. She is well-developed and well-groomed.   HENT:      Head: Normocephalic.      Right Ear: Hearing, ear canal and external ear normal. No drainage, swelling or tenderness. A middle ear effusion is present.      Left Ear: Hearing, ear canal and external ear normal. No drainage, swelling or tenderness. A middle ear effusion is present.      Nose: Mucosal edema and rhinorrhea present. Rhinorrhea is clear.      Right Turbinates: Swollen and pale.      Left Turbinates: Swollen and pale.      Right Sinus: No maxillary sinus tenderness or frontal sinus tenderness.      Left Sinus: No maxillary sinus tenderness or frontal sinus tenderness.      Mouth/Throat:      Mouth: Mucous membranes are moist.      Pharynx: Oropharynx is clear. Uvula midline. No

## 2024-07-17 ENCOUNTER — OFFICE VISIT (OUTPATIENT)
Age: 34
End: 2024-07-17

## 2024-07-17 VITALS
OXYGEN SATURATION: 99 % | TEMPERATURE: 98.2 F | RESPIRATION RATE: 16 BRPM | SYSTOLIC BLOOD PRESSURE: 124 MMHG | HEART RATE: 93 BPM | DIASTOLIC BLOOD PRESSURE: 70 MMHG

## 2024-07-17 DIAGNOSIS — J02.9 SORE THROAT: Primary | ICD-10-CM

## 2024-07-17 ASSESSMENT — ENCOUNTER SYMPTOMS
EYE REDNESS: 0
VOMITING: 0
VISUAL CHANGE: 0
CHEST TIGHTNESS: 0
DIARRHEA: 0
SORE THROAT: 1
CHANGE IN BOWEL HABIT: 0
SINUS PRESSURE: 0
EYE PAIN: 0
NAUSEA: 0
EYE ITCHING: 0
SHORTNESS OF BREATH: 0
EYE DISCHARGE: 0
ABDOMINAL PAIN: 0
COUGH: 1
SINUS PAIN: 0
RHINORRHEA: 1
SWOLLEN GLANDS: 1

## 2024-07-17 NOTE — PROGRESS NOTES
understanding.     SEs and risk vs benefits associated with medications prescribed discussed with patient who verbalized understanding. Pt verbalized understanding and agreement with plan of care. RTC for persisting/worsening symptoms or new complaints that arise. Discussed signs and symptoms that would warrant immediate evaluation including, but not limited to HA, blurred vision, speech disturbance, difficulty with ambulation/gait, numbness, tingling, weakness, syncope, chest pain, or shortness of breath.    I have reviewed the patient's medication list, past medical, family, social, and surgical history in detail and updated the patient record appropriately.    Jim Ariza, APRN - CNP

## 2024-09-05 ENCOUNTER — OFFICE VISIT (OUTPATIENT)
Age: 34
End: 2024-09-05

## 2024-09-05 VITALS
HEART RATE: 112 BPM | OXYGEN SATURATION: 99 % | DIASTOLIC BLOOD PRESSURE: 78 MMHG | SYSTOLIC BLOOD PRESSURE: 112 MMHG | RESPIRATION RATE: 18 BRPM

## 2024-09-05 DIAGNOSIS — R42 LIGHTHEADEDNESS: Primary | ICD-10-CM

## 2024-09-05 RX ORDER — IRON FUM,PS/FOLIC ACID/VITC/B3 125-1-40-3
CAPSULE ORAL
COMMUNITY
Start: 2024-08-30

## 2024-09-05 RX ORDER — METOCLOPRAMIDE 10 MG/1
TABLET ORAL
COMMUNITY
Start: 2024-07-09

## 2024-09-05 RX ORDER — CETIRIZINE HYDROCHLORIDE 10 MG/1
1 TABLET ORAL DAILY
COMMUNITY

## 2024-09-05 RX ORDER — FLUTICASONE FUROATE, UMECLIDINIUM BROMIDE AND VILANTEROL TRIFENATATE 100; 62.5; 25 UG/1; UG/1; UG/1
POWDER RESPIRATORY (INHALATION)
COMMUNITY

## 2024-09-05 ASSESSMENT — ENCOUNTER SYMPTOMS
PHOTOPHOBIA: 0
NAUSEA: 0
VOMITING: 0
SORE THROAT: 0
VISUAL CHANGE: 0
EYE DISCHARGE: 0
EYE PAIN: 0
ABDOMINAL PAIN: 0
SWOLLEN GLANDS: 0
EYE ITCHING: 0
CHANGE IN BOWEL HABIT: 0
EYE REDNESS: 0
COUGH: 0

## 2024-09-05 NOTE — PROGRESS NOTES
PROGRESS NOTE    SUBJECTIVE:   Margarita Person is a 33 y.o. female seen for lightheadedness with standing and seeing \"black spots\" intermittently. She is almost 38 weeks pregnant and has follow up with her OB tomorrow. States recent lab work showed that her iron is low. Confirms that she had a breakfast consisting of leonard, eggs, and an english muffin ~ 1.5 hours prior to clinic presentation and that typically she drinks ~ 80 oz of water daily though has only had about 8 oz so far this morning.     Chief Complaint    Dizziness         Dizziness  This is a new problem. The current episode started today. The problem has been waxing and waning. Associated symptoms include fatigue. Pertinent negatives include no abdominal pain, anorexia, arthralgias, change in bowel habit, chest pain, chills, congestion, coughing, diaphoresis, fever, headaches, joint swelling, myalgias, nausea, neck pain, numbness, rash, sore throat, swollen glands, urinary symptoms, vertigo, visual change, vomiting or weakness. The symptoms are aggravated by standing. She has tried nothing for the symptoms.       Current Outpatient Medications   Medication Sig Dispense Refill    metoclopramide (REGLAN) 10 MG tablet       Fe Fum-FePoly-FA-Vit C-Vit B3 (FOLIVANE-F) 125-1 MG CAPS       fluticasone-umeclidin-vilant (TRELEGY ELLIPTA) 100-62.5-25 MCG/ACT AEPB inhaler INHALE 1 PUFF INTO THE LUNGS EVERY DAY FOR 30 DAYS      cetirizine (ZYRTEC ALLERGY) 10 MG tablet Take 1 tablet by mouth daily      LOTEMAX SM 0.38 % GEL PLACE 1 DROP INTO LEFT EYE 3 TIMES A DAY X 2 WEEKS THEN STOP      triamcinolone (NASACORT) 55 MCG/ACT nasal inhaler 2 sprays by Each Nostril route daily 1 each 3    fexofenadine (ALLEGRA) 180 MG tablet TAKE 1 TABLET BY MOUTH EVERY DAY FOR 30 DAYS SWALLOW WHOLE WITH WATER DO NOT TAKE WITH FRUIT JUICES      albuterol sulfate HFA (PROVENTIL;VENTOLIN;PROAIR) 108 (90 Base) MCG/ACT inhaler       montelukast (SINGULAIR) 10 MG tablet TAKE 1 TABLET

## 2024-09-08 ENCOUNTER — PREP FOR PROCEDURE (OUTPATIENT)
Dept: OBGYN | Age: 34
End: 2024-09-08

## 2024-09-08 RX ORDER — MISOPROSTOL 200 UG/1
400 TABLET ORAL PRN
Status: CANCELLED | OUTPATIENT
Start: 2024-09-08

## 2024-09-08 RX ORDER — SODIUM CHLORIDE, SODIUM LACTATE, POTASSIUM CHLORIDE, CALCIUM CHLORIDE 600; 310; 30; 20 MG/100ML; MG/100ML; MG/100ML; MG/100ML
INJECTION, SOLUTION INTRAVENOUS CONTINUOUS
Status: CANCELLED | OUTPATIENT
Start: 2024-09-08

## 2024-09-08 RX ORDER — CARBOPROST TROMETHAMINE 250 UG/ML
250 INJECTION, SOLUTION INTRAMUSCULAR PRN
Status: CANCELLED | OUTPATIENT
Start: 2024-09-08

## 2024-09-08 RX ORDER — ONDANSETRON 2 MG/ML
4 INJECTION INTRAMUSCULAR; INTRAVENOUS EVERY 6 HOURS PRN
Status: CANCELLED | OUTPATIENT
Start: 2024-09-08

## 2024-09-08 RX ORDER — DOCUSATE SODIUM 100 MG/1
100 CAPSULE, LIQUID FILLED ORAL 2 TIMES DAILY
Status: CANCELLED | OUTPATIENT
Start: 2024-09-08

## 2024-09-08 RX ORDER — ONDANSETRON 4 MG/1
4 TABLET, ORALLY DISINTEGRATING ORAL EVERY 6 HOURS PRN
Status: CANCELLED | OUTPATIENT
Start: 2024-09-08

## 2024-09-08 RX ORDER — SODIUM CHLORIDE, SODIUM LACTATE, POTASSIUM CHLORIDE, AND CALCIUM CHLORIDE .6; .31; .03; .02 G/100ML; G/100ML; G/100ML; G/100ML
500 INJECTION, SOLUTION INTRAVENOUS PRN
Status: CANCELLED | OUTPATIENT
Start: 2024-09-08

## 2024-09-08 RX ORDER — TRANEXAMIC ACID 10 MG/ML
1000 INJECTION, SOLUTION INTRAVENOUS
Status: CANCELLED | OUTPATIENT
Start: 2024-09-08 | End: 2024-09-09

## 2024-09-08 RX ORDER — SODIUM CHLORIDE 0.9 % (FLUSH) 0.9 %
5-40 SYRINGE (ML) INJECTION PRN
Status: CANCELLED | OUTPATIENT
Start: 2024-09-08

## 2024-09-08 RX ORDER — SODIUM CHLORIDE, SODIUM LACTATE, POTASSIUM CHLORIDE, AND CALCIUM CHLORIDE .6; .31; .03; .02 G/100ML; G/100ML; G/100ML; G/100ML
1000 INJECTION, SOLUTION INTRAVENOUS PRN
Status: CANCELLED | OUTPATIENT
Start: 2024-09-08

## 2024-09-08 RX ORDER — SODIUM CHLORIDE 0.9 % (FLUSH) 0.9 %
5-40 SYRINGE (ML) INJECTION EVERY 12 HOURS SCHEDULED
Status: CANCELLED | OUTPATIENT
Start: 2024-09-08

## 2024-09-08 RX ORDER — METHYLERGONOVINE MALEATE 0.2 MG/ML
200 INJECTION INTRAVENOUS PRN
Status: CANCELLED | OUTPATIENT
Start: 2024-09-08

## 2024-09-08 RX ORDER — BUTORPHANOL TARTRATE 1 MG/ML
1 INJECTION, SOLUTION INTRAMUSCULAR; INTRAVENOUS
Status: CANCELLED | OUTPATIENT
Start: 2024-09-08

## 2024-09-08 RX ORDER — SODIUM CHLORIDE 9 MG/ML
INJECTION, SOLUTION INTRAVENOUS PRN
Status: CANCELLED | OUTPATIENT
Start: 2024-09-08

## 2024-09-08 RX ORDER — TERBUTALINE SULFATE 1 MG/ML
0.25 INJECTION, SOLUTION SUBCUTANEOUS
Status: CANCELLED | OUTPATIENT
Start: 2024-09-08 | End: 2024-09-09

## 2024-09-11 ENCOUNTER — HOSPITAL ENCOUNTER (OUTPATIENT)
Age: 34
Discharge: HOME | End: 2024-09-11
Attending: OBSTETRICS & GYNECOLOGY | Admitting: OBSTETRICS & GYNECOLOGY
Payer: COMMERCIAL

## 2024-09-11 VITALS
HEART RATE: 81 BPM | SYSTOLIC BLOOD PRESSURE: 101 MMHG | DIASTOLIC BLOOD PRESSURE: 57 MMHG | OXYGEN SATURATION: 98 % | RESPIRATION RATE: 16 BRPM | TEMPERATURE: 98.3 F

## 2024-09-11 LAB
ALBUMIN SERPL-MCNC: 2.5 G/DL (ref 3.5–5)
ALBUMIN/GLOB SERPL: 0.8 (ref 1–1.9)
ALP SERPL-CCNC: 126 U/L (ref 35–104)
ALT SERPL-CCNC: 12 U/L (ref 12–65)
ANION GAP SERPL CALC-SCNC: 11 MMOL/L (ref 9–18)
AST SERPL-CCNC: 13 U/L (ref 15–37)
BASOPHILS # BLD: 0 K/UL (ref 0–0.2)
BASOPHILS NFR BLD: 0 % (ref 0–2)
BILIRUB SERPL-MCNC: <0.2 MG/DL (ref 0–1.2)
BUN SERPL-MCNC: 7 MG/DL (ref 6–23)
CALCIUM SERPL-MCNC: 8.3 MG/DL (ref 8.8–10.2)
CHLORIDE SERPL-SCNC: 105 MMOL/L (ref 98–107)
CO2 SERPL-SCNC: 21 MMOL/L (ref 20–28)
CREAT SERPL-MCNC: 0.59 MG/DL (ref 0.6–1.1)
CREAT UR-MCNC: 59.5 MG/DL (ref 28–217)
DIFFERENTIAL METHOD BLD: ABNORMAL
EOSINOPHIL # BLD: 0 K/UL (ref 0–0.8)
EOSINOPHIL NFR BLD: 1 % (ref 0.5–7.8)
ERYTHROCYTE [DISTWIDTH] IN BLOOD BY AUTOMATED COUNT: 14.3 % (ref 11.9–14.6)
GLOBULIN SER CALC-MCNC: 3 G/DL (ref 2.3–3.5)
GLUCOSE SERPL-MCNC: 86 MG/DL (ref 70–99)
HCT VFR BLD AUTO: 34.1 % (ref 35.8–46.3)
HGB BLD-MCNC: 11 G/DL (ref 11.7–15.4)
IMM GRANULOCYTES # BLD AUTO: 0 K/UL (ref 0–0.5)
IMM GRANULOCYTES NFR BLD AUTO: 0 % (ref 0–5)
LYMPHOCYTES # BLD: 1.5 K/UL (ref 0.5–4.6)
LYMPHOCYTES NFR BLD: 23 % (ref 13–44)
MCH RBC QN AUTO: 28.5 PG (ref 26.1–32.9)
MCHC RBC AUTO-ENTMCNC: 32.3 G/DL (ref 31.4–35)
MCV RBC AUTO: 88.3 FL (ref 82–102)
MONOCYTES # BLD: 0.6 K/UL (ref 0.1–1.3)
MONOCYTES NFR BLD: 9 % (ref 4–12)
NEUTS SEG # BLD: 4.5 K/UL (ref 1.7–8.2)
NEUTS SEG NFR BLD: 67 % (ref 43–78)
NRBC # BLD: 0 K/UL (ref 0–0.2)
PLATELET # BLD AUTO: 232 K/UL (ref 150–450)
PMV BLD AUTO: 9.7 FL (ref 9.4–12.3)
POTASSIUM SERPL-SCNC: 4 MMOL/L (ref 3.5–5.1)
PROT SERPL-MCNC: 5.4 G/DL (ref 6.3–8.2)
PROT UR-MCNC: 9 MG/DL
PROT/CREAT UR-RTO: 0.1
RBC # BLD AUTO: 3.86 M/UL (ref 4.05–5.2)
SODIUM SERPL-SCNC: 137 MMOL/L (ref 136–145)
URATE SERPL-MCNC: 3.1 MG/DL (ref 2.5–7.1)
WBC # BLD AUTO: 6.7 K/UL (ref 4.3–11.1)

## 2024-09-11 PROCEDURE — 99284 EMERGENCY DEPT VISIT MOD MDM: CPT

## 2024-09-11 PROCEDURE — 59025 FETAL NON-STRESS TEST: CPT

## 2024-09-11 PROCEDURE — 80053 COMPREHEN METABOLIC PANEL: CPT

## 2024-09-11 PROCEDURE — 84550 ASSAY OF BLOOD/URIC ACID: CPT

## 2024-09-11 PROCEDURE — 85025 COMPLETE CBC W/AUTO DIFF WBC: CPT

## 2024-09-11 PROCEDURE — 82570 ASSAY OF URINE CREATININE: CPT

## 2024-09-11 PROCEDURE — 84156 ASSAY OF PROTEIN URINE: CPT

## 2024-09-11 RX ORDER — METOCLOPRAMIDE 10 MG/1
10 TABLET ORAL 4 TIMES DAILY
Qty: 360 TABLET | Refills: 0 | OUTPATIENT
Start: 2024-09-11 | End: 2024-12-10

## 2024-09-24 ENCOUNTER — HOSPITAL ENCOUNTER (INPATIENT)
Age: 34
LOS: 2 days | Discharge: HOME | End: 2024-09-26
Attending: OBSTETRICS & GYNECOLOGY | Admitting: OBSTETRICS & GYNECOLOGY
Payer: COMMERCIAL

## 2024-09-24 DIAGNOSIS — Z3A.40 40 WEEKS GESTATION OF PREGNANCY: Primary | ICD-10-CM

## 2024-09-24 LAB
ABO + RH BLD: NORMAL
BLOOD GROUP ANTIBODIES SERPL: NORMAL
ERYTHROCYTE [DISTWIDTH] IN BLOOD BY AUTOMATED COUNT: 14.5 % (ref 11.9–14.6)
HCT VFR BLD AUTO: 36 % (ref 35.8–46.3)
HGB BLD-MCNC: 11.7 G/DL (ref 11.7–15.4)
MCH RBC QN AUTO: 28.3 PG (ref 26.1–32.9)
MCHC RBC AUTO-ENTMCNC: 32.5 G/DL (ref 31.4–35)
MCV RBC AUTO: 87 FL (ref 82–102)
NRBC # BLD: 0 K/UL (ref 0–0.2)
PLATELET # BLD AUTO: 270 K/UL (ref 150–450)
PMV BLD AUTO: 10.2 FL (ref 9.4–12.3)
RBC # BLD AUTO: 4.14 M/UL (ref 4.05–5.2)
SPECIMEN EXP DATE BLD: NORMAL
WBC # BLD AUTO: 6.7 K/UL (ref 4.3–11.1)

## 2024-09-24 PROCEDURE — 86780 TREPONEMA PALLIDUM: CPT

## 2024-09-24 PROCEDURE — 85027 COMPLETE CBC AUTOMATED: CPT

## 2024-09-24 PROCEDURE — 1100000000 HC RM PRIVATE

## 2024-09-24 PROCEDURE — 86901 BLOOD TYPING SEROLOGIC RH(D): CPT

## 2024-09-24 PROCEDURE — 86850 RBC ANTIBODY SCREEN: CPT

## 2024-09-24 PROCEDURE — 86900 BLOOD TYPING SEROLOGIC ABO: CPT

## 2024-09-24 RX ORDER — TERBUTALINE SULFATE 1 MG/ML
0.25 INJECTION, SOLUTION SUBCUTANEOUS
Status: ACTIVE | OUTPATIENT
Start: 2024-09-24 | End: 2024-09-25

## 2024-09-24 RX ORDER — TRANEXAMIC ACID 10 MG/ML
1000 INJECTION, SOLUTION INTRAVENOUS
Status: ACTIVE | OUTPATIENT
Start: 2024-09-24 | End: 2024-09-25

## 2024-09-24 RX ORDER — METHYLERGONOVINE MALEATE 0.2 MG/ML
200 INJECTION INTRAVENOUS PRN
Status: DISCONTINUED | OUTPATIENT
Start: 2024-09-24 | End: 2024-09-26 | Stop reason: HOSPADM

## 2024-09-24 RX ORDER — SODIUM CHLORIDE 0.9 % (FLUSH) 0.9 %
5-40 SYRINGE (ML) INJECTION EVERY 12 HOURS SCHEDULED
Status: DISCONTINUED | OUTPATIENT
Start: 2024-09-24 | End: 2024-09-26 | Stop reason: HOSPADM

## 2024-09-24 RX ORDER — SODIUM CHLORIDE 9 MG/ML
INJECTION, SOLUTION INTRAVENOUS PRN
Status: DISCONTINUED | OUTPATIENT
Start: 2024-09-24 | End: 2024-09-26 | Stop reason: HOSPADM

## 2024-09-24 RX ORDER — SODIUM CHLORIDE, SODIUM LACTATE, POTASSIUM CHLORIDE, AND CALCIUM CHLORIDE .6; .31; .03; .02 G/100ML; G/100ML; G/100ML; G/100ML
1000 INJECTION, SOLUTION INTRAVENOUS PRN
Status: DISCONTINUED | OUTPATIENT
Start: 2024-09-24 | End: 2024-09-26 | Stop reason: HOSPADM

## 2024-09-24 RX ORDER — SODIUM CHLORIDE 0.9 % (FLUSH) 0.9 %
5-40 SYRINGE (ML) INJECTION PRN
Status: DISCONTINUED | OUTPATIENT
Start: 2024-09-24 | End: 2024-09-26 | Stop reason: HOSPADM

## 2024-09-24 RX ORDER — CARBOPROST TROMETHAMINE 250 UG/ML
250 INJECTION, SOLUTION INTRAMUSCULAR PRN
Status: DISCONTINUED | OUTPATIENT
Start: 2024-09-24 | End: 2024-09-26 | Stop reason: HOSPADM

## 2024-09-24 RX ORDER — DOCUSATE SODIUM 100 MG/1
100 CAPSULE, LIQUID FILLED ORAL 2 TIMES DAILY
Status: DISCONTINUED | OUTPATIENT
Start: 2024-09-24 | End: 2024-09-26 | Stop reason: HOSPADM

## 2024-09-24 RX ORDER — SODIUM CHLORIDE, SODIUM LACTATE, POTASSIUM CHLORIDE, CALCIUM CHLORIDE 600; 310; 30; 20 MG/100ML; MG/100ML; MG/100ML; MG/100ML
INJECTION, SOLUTION INTRAVENOUS CONTINUOUS
Status: DISCONTINUED | OUTPATIENT
Start: 2024-09-24 | End: 2024-09-26 | Stop reason: HOSPADM

## 2024-09-24 RX ORDER — SODIUM CHLORIDE, SODIUM LACTATE, POTASSIUM CHLORIDE, AND CALCIUM CHLORIDE .6; .31; .03; .02 G/100ML; G/100ML; G/100ML; G/100ML
500 INJECTION, SOLUTION INTRAVENOUS PRN
Status: DISCONTINUED | OUTPATIENT
Start: 2024-09-24 | End: 2024-09-26 | Stop reason: HOSPADM

## 2024-09-25 ENCOUNTER — ANESTHESIA (OUTPATIENT)
Dept: LABOR AND DELIVERY | Age: 34
End: 2024-09-25
Payer: COMMERCIAL

## 2024-09-25 ENCOUNTER — ANESTHESIA EVENT (OUTPATIENT)
Dept: LABOR AND DELIVERY | Age: 34
End: 2024-09-25
Payer: COMMERCIAL

## 2024-09-25 LAB — T PALLIDUM AB SER QL IA: NONREACTIVE

## 2024-09-25 PROCEDURE — 3700000025 EPIDURAL BLOCK: Performed by: ANESTHESIOLOGY

## 2024-09-25 PROCEDURE — 00HU33Z INSERTION OF INFUSION DEVICE INTO SPINAL CANAL, PERCUTANEOUS APPROACH: ICD-10-PCS | Performed by: OBSTETRICS & GYNECOLOGY

## 2024-09-25 PROCEDURE — 1100000000 HC RM PRIVATE

## 2024-09-25 PROCEDURE — 7100000010 HC PHASE II RECOVERY - FIRST 15 MIN

## 2024-09-25 PROCEDURE — 7100000011 HC PHASE II RECOVERY - ADDTL 15 MIN

## 2024-09-25 PROCEDURE — 2580000003 HC RX 258: Performed by: OBSTETRICS & GYNECOLOGY

## 2024-09-25 PROCEDURE — 51701 INSERT BLADDER CATHETER: CPT

## 2024-09-25 PROCEDURE — 6370000000 HC RX 637 (ALT 250 FOR IP): Performed by: OBSTETRICS & GYNECOLOGY

## 2024-09-25 PROCEDURE — 6360000002 HC RX W HCPCS: Performed by: OBSTETRICS & GYNECOLOGY

## 2024-09-25 PROCEDURE — 7220000101 HC DELIVERY VAGINAL/SINGLE

## 2024-09-25 PROCEDURE — 4A1HXCZ MONITORING OF PRODUCTS OF CONCEPTION, CARDIAC RATE, EXTERNAL APPROACH: ICD-10-PCS | Performed by: OBSTETRICS & GYNECOLOGY

## 2024-09-25 PROCEDURE — 10907ZC DRAINAGE OF AMNIOTIC FLUID, THERAPEUTIC FROM PRODUCTS OF CONCEPTION, VIA NATURAL OR ARTIFICIAL OPENING: ICD-10-PCS | Performed by: OBSTETRICS & GYNECOLOGY

## 2024-09-25 PROCEDURE — 7210000100 HC LABOR FEE PER 1 HR

## 2024-09-25 RX ORDER — SODIUM CHLORIDE 9 MG/ML
INJECTION, SOLUTION INTRAVENOUS PRN
Status: DISCONTINUED | OUTPATIENT
Start: 2024-09-25 | End: 2024-09-26 | Stop reason: HOSPADM

## 2024-09-25 RX ORDER — OXYCODONE HYDROCHLORIDE 5 MG/1
10 TABLET ORAL EVERY 4 HOURS PRN
Status: DISCONTINUED | OUTPATIENT
Start: 2024-09-25 | End: 2024-09-26 | Stop reason: HOSPADM

## 2024-09-25 RX ORDER — SODIUM CHLORIDE 0.9 % (FLUSH) 0.9 %
5-40 SYRINGE (ML) INJECTION EVERY 12 HOURS SCHEDULED
Status: DISCONTINUED | OUTPATIENT
Start: 2024-09-25 | End: 2024-09-26 | Stop reason: HOSPADM

## 2024-09-25 RX ORDER — POLYETHYLENE GLYCOL 3350 17 G/17G
17 POWDER, FOR SOLUTION ORAL DAILY
Status: DISCONTINUED | OUTPATIENT
Start: 2024-09-25 | End: 2024-09-26 | Stop reason: HOSPADM

## 2024-09-25 RX ORDER — MAGNESIUM CARB/ALUMINUM HYDROX 105-160MG
120 TABLET,CHEWABLE ORAL DAILY PRN
Status: DISCONTINUED | OUTPATIENT
Start: 2024-09-25 | End: 2024-09-26 | Stop reason: HOSPADM

## 2024-09-25 RX ORDER — SODIUM CHLORIDE 0.9 % (FLUSH) 0.9 %
5-40 SYRINGE (ML) INJECTION PRN
Status: DISCONTINUED | OUTPATIENT
Start: 2024-09-25 | End: 2024-09-26 | Stop reason: HOSPADM

## 2024-09-25 RX ORDER — SIMETHICONE 80 MG
80 TABLET,CHEWABLE ORAL EVERY 6 HOURS PRN
Status: DISCONTINUED | OUTPATIENT
Start: 2024-09-25 | End: 2024-09-26 | Stop reason: HOSPADM

## 2024-09-25 RX ORDER — MISOPROSTOL 200 UG/1
400 TABLET ORAL PRN
Status: DISCONTINUED | OUTPATIENT
Start: 2024-09-25 | End: 2024-09-26 | Stop reason: HOSPADM

## 2024-09-25 RX ORDER — ONDANSETRON 2 MG/ML
4 INJECTION INTRAMUSCULAR; INTRAVENOUS EVERY 6 HOURS PRN
Status: DISCONTINUED | OUTPATIENT
Start: 2024-09-25 | End: 2024-09-26 | Stop reason: HOSPADM

## 2024-09-25 RX ORDER — NALOXONE HYDROCHLORIDE 0.4 MG/ML
INJECTION, SOLUTION INTRAMUSCULAR; INTRAVENOUS; SUBCUTANEOUS PRN
Status: DISCONTINUED | OUTPATIENT
Start: 2024-09-25 | End: 2024-09-26 | Stop reason: HOSPADM

## 2024-09-25 RX ORDER — ACETAMINOPHEN 500 MG
1000 TABLET ORAL EVERY 6 HOURS
Status: DISCONTINUED | OUTPATIENT
Start: 2024-09-26 | End: 2024-09-26 | Stop reason: HOSPADM

## 2024-09-25 RX ORDER — DOCUSATE SODIUM 100 MG/1
100 CAPSULE, LIQUID FILLED ORAL 2 TIMES DAILY PRN
Status: DISCONTINUED | OUTPATIENT
Start: 2024-09-25 | End: 2024-09-26 | Stop reason: HOSPADM

## 2024-09-25 RX ORDER — ONDANSETRON 4 MG/1
4 TABLET, ORALLY DISINTEGRATING ORAL EVERY 6 HOURS PRN
Status: DISCONTINUED | OUTPATIENT
Start: 2024-09-25 | End: 2024-09-26 | Stop reason: HOSPADM

## 2024-09-25 RX ORDER — IBUPROFEN 800 MG/1
800 TABLET, FILM COATED ORAL EVERY 6 HOURS
Status: DISCONTINUED | OUTPATIENT
Start: 2024-09-25 | End: 2024-09-26 | Stop reason: HOSPADM

## 2024-09-25 RX ORDER — FAMOTIDINE 20 MG/1
20 TABLET, FILM COATED ORAL 2 TIMES DAILY
Status: DISCONTINUED | OUTPATIENT
Start: 2024-09-25 | End: 2024-09-26 | Stop reason: HOSPADM

## 2024-09-25 RX ORDER — ROPIVACAINE HYDROCHLORIDE 2 MG/ML
INJECTION, SOLUTION EPIDURAL; INFILTRATION; PERINEURAL
Status: DISCONTINUED | OUTPATIENT
Start: 2024-09-25 | End: 2024-09-25 | Stop reason: SDUPTHER

## 2024-09-25 RX ORDER — OXYCODONE HYDROCHLORIDE 5 MG/1
5 TABLET ORAL EVERY 4 HOURS PRN
Status: DISCONTINUED | OUTPATIENT
Start: 2024-09-25 | End: 2024-09-26 | Stop reason: HOSPADM

## 2024-09-25 RX ORDER — ACETAMINOPHEN 500 MG
1000 TABLET ORAL EVERY 6 HOURS
Status: DISCONTINUED | OUTPATIENT
Start: 2024-09-25 | End: 2024-09-25

## 2024-09-25 RX ORDER — SODIUM CHLORIDE, SODIUM LACTATE, POTASSIUM CHLORIDE, CALCIUM CHLORIDE 600; 310; 30; 20 MG/100ML; MG/100ML; MG/100ML; MG/100ML
INJECTION, SOLUTION INTRAVENOUS CONTINUOUS
Status: DISCONTINUED | OUTPATIENT
Start: 2024-09-25 | End: 2024-09-26 | Stop reason: HOSPADM

## 2024-09-25 RX ORDER — CALCIUM CARBONATE 500 MG/1
1000 TABLET, CHEWABLE ORAL 3 TIMES DAILY PRN
Status: DISCONTINUED | OUTPATIENT
Start: 2024-09-25 | End: 2024-09-26 | Stop reason: HOSPADM

## 2024-09-25 RX ORDER — LANOLIN
CREAM (ML) TOPICAL PRN
Status: DISCONTINUED | OUTPATIENT
Start: 2024-09-25 | End: 2024-09-26 | Stop reason: HOSPADM

## 2024-09-25 RX ADMIN — Medication: at 22:28

## 2024-09-25 RX ADMIN — SODIUM CHLORIDE, SODIUM LACTATE, POTASSIUM CHLORIDE, AND CALCIUM CHLORIDE 1000 ML: .6; .31; .03; .02 INJECTION, SOLUTION INTRAVENOUS at 12:15

## 2024-09-25 RX ADMIN — DOCUSATE SODIUM 100 MG: 100 CAPSULE, LIQUID FILLED ORAL at 22:24

## 2024-09-25 RX ADMIN — ROPIVACAINE HYDROCHLORIDE 10 ML/HR: 2 INJECTION, SOLUTION EPIDURAL; INFILTRATION; PERINEURAL at 12:53

## 2024-09-25 RX ADMIN — OXYTOCIN 2 MILLI-UNITS/MIN: 10 INJECTION, SOLUTION INTRAMUSCULAR; INTRAVENOUS at 08:35

## 2024-09-25 RX ADMIN — SODIUM CHLORIDE, POTASSIUM CHLORIDE, SODIUM LACTATE AND CALCIUM CHLORIDE: 600; 310; 30; 20 INJECTION, SOLUTION INTRAVENOUS at 08:12

## 2024-09-25 RX ADMIN — SODIUM CHLORIDE 2.5 MILLION UNITS: 9 INJECTION, SOLUTION INTRAVENOUS at 12:38

## 2024-09-25 RX ADMIN — PENICILLIN G POTASSIUM 5 MILLION UNITS: 5000000 INJECTION, POWDER, FOR SOLUTION INTRAMUSCULAR; INTRAVENOUS at 08:13

## 2024-09-25 RX ADMIN — ROPIVACAINE HYDROCHLORIDE 10 ML: 2 INJECTION, SOLUTION EPIDURAL; INFILTRATION; PERINEURAL at 12:52

## 2024-09-25 RX ADMIN — ANTACID TABLETS 1000 MG: 500 TABLET, CHEWABLE ORAL at 02:17

## 2024-09-25 RX ADMIN — Medication 166.7 ML: at 15:21

## 2024-09-25 RX ADMIN — IBUPROFEN 800 MG: 800 TABLET, FILM COATED ORAL at 22:25

## 2024-09-25 RX ADMIN — WITCH HAZEL: 500 SOLUTION RECTAL; TOPICAL at 22:28

## 2024-09-25 ASSESSMENT — PAIN SCALES - GENERAL
PAINLEVEL_OUTOF10: 0
PAINLEVEL_OUTOF10: 1

## 2024-09-26 VITALS
OXYGEN SATURATION: 96 % | HEART RATE: 89 BPM | SYSTOLIC BLOOD PRESSURE: 102 MMHG | WEIGHT: 198 LBS | HEIGHT: 66 IN | RESPIRATION RATE: 18 BRPM | DIASTOLIC BLOOD PRESSURE: 65 MMHG | BODY MASS INDEX: 31.82 KG/M2 | TEMPERATURE: 98.6 F

## 2024-09-26 PROCEDURE — 6370000000 HC RX 637 (ALT 250 FOR IP): Performed by: OBSTETRICS & GYNECOLOGY

## 2024-09-26 RX ORDER — OXYCODONE HYDROCHLORIDE 5 MG/1
5 TABLET ORAL EVERY 4 HOURS PRN
Qty: 20 TABLET | Refills: 0 | Status: SHIPPED | OUTPATIENT
Start: 2024-09-26 | End: 2024-09-29

## 2024-09-26 RX ORDER — IBUPROFEN 800 MG/1
800 TABLET, FILM COATED ORAL EVERY 6 HOURS
Qty: 120 TABLET | Refills: 3 | Status: SHIPPED | OUTPATIENT
Start: 2024-09-26

## 2024-09-26 RX ADMIN — ACETAMINOPHEN 1000 MG: 500 TABLET, FILM COATED ORAL at 09:03

## 2024-09-26 RX ADMIN — DOCUSATE SODIUM 100 MG: 100 CAPSULE, LIQUID FILLED ORAL at 09:03

## 2024-09-26 RX ADMIN — ACETAMINOPHEN 1000 MG: 500 TABLET, FILM COATED ORAL at 01:25

## 2024-09-26 RX ADMIN — IBUPROFEN 800 MG: 800 TABLET, FILM COATED ORAL at 04:30

## 2024-09-26 RX ADMIN — ACETAMINOPHEN 1000 MG: 500 TABLET, FILM COATED ORAL at 17:43

## 2024-09-26 RX ADMIN — OXYCODONE HYDROCHLORIDE 10 MG: 5 TABLET ORAL at 17:38

## 2024-09-26 RX ADMIN — OXYCODONE HYDROCHLORIDE 5 MG: 5 TABLET ORAL at 09:03

## 2024-09-26 RX ADMIN — IBUPROFEN 800 MG: 800 TABLET, FILM COATED ORAL at 12:55

## 2024-09-26 ASSESSMENT — PAIN DESCRIPTION - ORIENTATION
ORIENTATION: ANTERIOR;LOWER
ORIENTATION: ANTERIOR;LOWER

## 2024-09-26 ASSESSMENT — PAIN DESCRIPTION - LOCATION
LOCATION: ABDOMEN

## 2024-09-26 ASSESSMENT — PAIN SCALES - GENERAL
PAINLEVEL_OUTOF10: 7
PAINLEVEL_OUTOF10: 7
PAINLEVEL_OUTOF10: 5
PAINLEVEL_OUTOF10: 2

## 2024-09-26 ASSESSMENT — PAIN DESCRIPTION - DESCRIPTORS
DESCRIPTORS: ACHING;CRAMPING
DESCRIPTORS: ACHING;CRAMPING;THROBBING

## 2025-01-21 ENCOUNTER — OFFICE VISIT (OUTPATIENT)
Age: 35
End: 2025-01-21

## 2025-01-21 VITALS
SYSTOLIC BLOOD PRESSURE: 122 MMHG | DIASTOLIC BLOOD PRESSURE: 78 MMHG | HEART RATE: 82 BPM | TEMPERATURE: 98.3 F | RESPIRATION RATE: 16 BRPM | OXYGEN SATURATION: 99 %

## 2025-01-21 DIAGNOSIS — J32.9 RHINOSINUSITIS: Primary | ICD-10-CM

## 2025-01-21 PROBLEM — R03.0 ELEVATED BLOOD-PRESSURE READING WITHOUT DIAGNOSIS OF HYPERTENSION: Status: ACTIVE | Noted: 2024-09-11

## 2025-01-21 PROBLEM — Z3A.40 40 WEEKS GESTATION OF PREGNANCY: Status: RESOLVED | Noted: 2024-09-24 | Resolved: 2025-01-21

## 2025-01-21 LAB
GROUP A STREP ANTIGEN, POC: NEGATIVE
INFLUENZA A ANTIGEN, POC: NEGATIVE
INFLUENZA B ANTIGEN, POC: NEGATIVE
LOT EXPIRE DATE: NORMAL
LOT KIT NUMBER: NORMAL
SARS-COV-2 RNA, POC: NEGATIVE
VALID INTERNAL CONTROL, POC: YES
VALID INTERNAL CONTROL: YES
VENDOR AND KIT NAME POC: NORMAL

## 2025-01-21 ASSESSMENT — ENCOUNTER SYMPTOMS
DIARRHEA: 0
EYE DISCHARGE: 0
COUGH: 0
WHEEZING: 0
ABDOMINAL PAIN: 0
EYE PAIN: 0
NAUSEA: 0
VOMITING: 0
SWOLLEN GLANDS: 1
SINUS PAIN: 0
EYE REDNESS: 0
RHINORRHEA: 0
EYE ITCHING: 1
SORE THROAT: 1
SINUS PRESSURE: 1

## 2025-01-21 NOTE — PROGRESS NOTES
PROGRESS NOTE    SUBJECTIVE:   Margarita Person is a 34 y.o. female seen in the [facility] at [facility] for cold symptoms that started 2 days ago. Denies any known or suspected sick contacts    Chief Complaint    Cold Symptoms           History provided by:  Patient   used: No    Cold Symptoms   This is a new problem. Episode onset: 2 days. The problem has been unchanged. There has been no fever. Associated symptoms include congestion, headaches, a sore throat and swollen glands. Pertinent negatives include no abdominal pain, chest pain, coughing, diarrhea, dysuria, ear pain, joint pain, joint swelling, nausea, neck pain, plugged ear sensation, rash, rhinorrhea, sinus pain, sneezing, vomiting or wheezing. She has tried acetaminophen, increased fluids and sleep (vitamin c) for the symptoms. The treatment provided mild relief.       Current Outpatient Medications   Medication Sig Dispense Refill    ibuprofen (ADVIL;MOTRIN) 800 MG tablet Take 1 tablet by mouth every 6 hours 120 tablet 3    cetirizine (ZYRTEC ALLERGY) 10 MG tablet Take 1 tablet by mouth daily      Fe Fum-FePoly-FA-Vit C-Vit B3 (FOLIVANE-F) 125-1 MG CAPS        No current facility-administered medications for this visit.      Allergies   Allergen Reactions    Other Other (See Comments)     Tree and shrub pollen-sneezing       Social History     Tobacco Use    Smoking status: Never   Vaping Use    Vaping status: Never Used   Substance Use Topics    Alcohol use: Not Currently    Drug use: Never        Review of Systems   Constitutional:  Positive for chills and fatigue. Negative for fever.   HENT:  Positive for congestion, sinus pressure and sore throat. Negative for ear pain, postnasal drip, rhinorrhea, sinus pain and sneezing.    Eyes:  Positive for itching. Negative for pain, discharge and redness.   Respiratory:  Negative for cough and wheezing.    Cardiovascular:  Negative for chest

## 2025-02-03 ENCOUNTER — OFFICE VISIT (OUTPATIENT)
Age: 35
End: 2025-02-03

## 2025-02-03 VITALS
RESPIRATION RATE: 16 BRPM | HEART RATE: 101 BPM | TEMPERATURE: 98.1 F | OXYGEN SATURATION: 97 % | SYSTOLIC BLOOD PRESSURE: 116 MMHG | DIASTOLIC BLOOD PRESSURE: 74 MMHG

## 2025-02-03 DIAGNOSIS — J32.9 RHINOSINUSITIS: Primary | ICD-10-CM

## 2025-02-03 DIAGNOSIS — J45.20 MILD INTERMITTENT ASTHMA WITHOUT COMPLICATION: ICD-10-CM

## 2025-02-03 RX ORDER — ALBUTEROL SULFATE 90 UG/1
2 INHALANT RESPIRATORY (INHALATION) 4 TIMES DAILY PRN
Qty: 18 G | Refills: 0 | Status: SHIPPED | OUTPATIENT
Start: 2025-02-03

## 2025-02-03 ASSESSMENT — ENCOUNTER SYMPTOMS
ABDOMINAL PAIN: 0
NAUSEA: 0
SINUS PAIN: 0
WHEEZING: 1
SWOLLEN GLANDS: 1
VOMITING: 0
DIARRHEA: 0
COUGH: 1
SHORTNESS OF BREATH: 0
EYE DISCHARGE: 0
SORE THROAT: 0
EYE REDNESS: 0
SINUS PRESSURE: 1
CHEST TIGHTNESS: 1
EYE ITCHING: 0
EYE PAIN: 0
RHINORRHEA: 0

## 2025-02-03 NOTE — PROGRESS NOTES
PROGRESS NOTE    SUBJECTIVE:   Margarita Person is a 34 y.o. female seen in the [facility] based [facility] located at [facility] for follow up of lingering cold symptoms and cough that started over a week ago. Has a history of asthma and she is concerned that she is heading for an asthma attack soon. Denies any known or suspected sick contacts    Chief Complaint    Cough           History provided by:  Patient   used: No    Cold Symptoms   This is a new problem. The current episode started 1 to 4 weeks ago. The problem has been unchanged. There has been no fever. Associated symptoms include congestion, coughing, swollen glands and wheezing. Pertinent negatives include no abdominal pain, chest pain, diarrhea, dysuria, ear pain, headaches, joint pain, joint swelling, nausea, neck pain, plugged ear sensation, rash, rhinorrhea, sinus pain, sneezing, sore throat or vomiting. She has tried acetaminophen, increased fluids and sleep (vitamin c, OTC cold medication) for the symptoms. The treatment provided mild relief.       Current Outpatient Medications   Medication Sig Dispense Refill    amoxicillin-clavulanate (AUGMENTIN) 875-125 MG per tablet Take 1 tablet by mouth 2 times daily for 5 days 10 tablet 0    albuterol sulfate HFA (VENTOLIN HFA) 108 (90 Base) MCG/ACT inhaler Inhale 2 puffs into the lungs 4 times daily as needed for Wheezing 18 g 0    ibuprofen (ADVIL;MOTRIN) 800 MG tablet Take 1 tablet by mouth every 6 hours 120 tablet 3    cetirizine (ZYRTEC ALLERGY) 10 MG tablet Take 1 tablet by mouth daily      Fe Fum-FePoly-FA-Vit C-Vit B3 (FOLIVANE-F) 125-1 MG CAPS        No current facility-administered medications for this visit.      Allergies   Allergen Reactions    Other Other (See Comments)     Tree and shrub pollen-sneezing       Social History     Tobacco Use    Smoking status: Never   Vaping Use    Vaping status: Never Used   Substance Use Topics    Alcohol use: Not

## 2025-04-09 ENCOUNTER — OFFICE VISIT (OUTPATIENT)
Age: 35
End: 2025-04-09

## 2025-04-09 VITALS
HEART RATE: 57 BPM | RESPIRATION RATE: 16 BRPM | DIASTOLIC BLOOD PRESSURE: 98 MMHG | SYSTOLIC BLOOD PRESSURE: 134 MMHG | OXYGEN SATURATION: 98 % | TEMPERATURE: 97.7 F

## 2025-04-09 DIAGNOSIS — J02.9 SORE THROAT: ICD-10-CM

## 2025-04-09 DIAGNOSIS — J02.0 STREP PHARYNGITIS: Primary | ICD-10-CM

## 2025-04-09 LAB
GROUP A STREP ANTIGEN, POC: POSITIVE
INFLUENZA A ANTIGEN, POC: NEGATIVE
INFLUENZA B ANTIGEN, POC: NEGATIVE
LOT EXPIRE DATE: NORMAL
LOT KIT NUMBER: NORMAL
SARS-COV-2 RNA, POC: NEGATIVE
VALID INTERNAL CONTROL, POC: YES
VALID INTERNAL CONTROL: YES
VENDOR AND KIT NAME POC: NORMAL

## 2025-04-09 RX ORDER — AMOXICILLIN 500 MG/1
500 CAPSULE ORAL 2 TIMES DAILY
Qty: 20 CAPSULE | Refills: 0 | Status: SHIPPED | OUTPATIENT
Start: 2025-04-09 | End: 2025-04-19

## 2025-04-09 ASSESSMENT — ENCOUNTER SYMPTOMS
SINUS PAIN: 0
EYE PAIN: 0
EYE ITCHING: 0
WHEEZING: 0
EYE DISCHARGE: 0
NAUSEA: 1
CHEST TIGHTNESS: 0
RHINORRHEA: 0
EYE REDNESS: 0
SHORTNESS OF BREATH: 0
DIARRHEA: 0
COUGH: 0
SINUS PRESSURE: 0
VOMITING: 0
ABDOMINAL PAIN: 0
SORE THROAT: 1

## 2025-04-09 NOTE — PROGRESS NOTES
SEs and risk vs benefits associated with medications prescribed discussed with patient who verbalized understanding. Pt verbalized understanding and agreement with plan of care. RTC for persisting/worsening symptoms or new complaints that arise. Discussed signs and symptoms that would warrant immediate evaluation including, but not limited to HA, blurred vision, speech disturbance, difficulty with ambulation/gait, numbness, tingling, weakness, syncope, chest pain, or shortness of breath.    I have reviewed the patient's medication list, past medical, family, social, and surgical history in detail and updated the patient record appropriately.    Jim Ariza, APRN - CNP

## 2025-04-17 ENCOUNTER — OFFICE VISIT (OUTPATIENT)
Age: 35
End: 2025-04-17

## 2025-04-17 VITALS
OXYGEN SATURATION: 99 % | DIASTOLIC BLOOD PRESSURE: 82 MMHG | SYSTOLIC BLOOD PRESSURE: 122 MMHG | HEART RATE: 81 BPM | RESPIRATION RATE: 16 BRPM | TEMPERATURE: 97.9 F

## 2025-04-17 DIAGNOSIS — J32.9 RHINOSINUSITIS: Primary | ICD-10-CM

## 2025-04-17 LAB
INFLUENZA A ANTIGEN, POC: NEGATIVE
INFLUENZA B ANTIGEN, POC: NEGATIVE
LOT EXPIRE DATE: NORMAL
LOT KIT NUMBER: NORMAL
SARS-COV-2 RNA, POC: NEGATIVE
VALID INTERNAL CONTROL: YES
VENDOR AND KIT NAME POC: NORMAL

## 2025-04-17 ASSESSMENT — ENCOUNTER SYMPTOMS
SINUS PRESSURE: 1
NAUSEA: 0
EYE ITCHING: 0
ABDOMINAL PAIN: 0
EYE DISCHARGE: 0
SORE THROAT: 0
RHINORRHEA: 0
EYE REDNESS: 0
SINUS PAIN: 0
SHORTNESS OF BREATH: 0
DIARRHEA: 0
COUGH: 0
HOARSE VOICE: 0
SWOLLEN GLANDS: 0
VOMITING: 0
EYE PAIN: 0

## 2025-04-17 NOTE — PROGRESS NOTES
PROGRESS NOTE    SUBJECTIVE:   Margarita Person is a 34 y.o. female seen in the employer based health center located at [facility] for follow up as she is still having persistent nasal congestion while on antibiotic for strep from 4/9/25. Has seasonal allergies but has not been taking medication as it started to decrease her breat milk supply    Chief Complaint    Nasal Congestion           History provided by:  Patient   used: No    Sinusitis  This is a new problem. The current episode started 1 to 4 weeks ago. The problem is unchanged. There has been no fever. The pain is mild. Associated symptoms include congestion and sinus pressure. Pertinent negatives include no chills, coughing, diaphoresis, ear pain, headaches, hoarse voice, neck pain, shortness of breath, sneezing, sore throat or swollen glands. Treatments tried: amoxicllin, zicam decongestant spray.       Current Outpatient Medications   Medication Sig Dispense Refill    amoxicillin (AMOXIL) 500 MG capsule Take 1 capsule by mouth 2 times daily for 10 days 20 capsule 0    albuterol sulfate HFA (VENTOLIN HFA) 108 (90 Base) MCG/ACT inhaler Inhale 2 puffs into the lungs 4 times daily as needed for Wheezing 18 g 0    ibuprofen (ADVIL;MOTRIN) 800 MG tablet Take 1 tablet by mouth every 6 hours 120 tablet 3    cetirizine (ZYRTEC ALLERGY) 10 MG tablet Take 1 tablet by mouth daily      Fe Fum-FePoly-FA-Vit C-Vit B3 (FOLIVANE-F) 125-1 MG CAPS        No current facility-administered medications for this visit.      Allergies   Allergen Reactions    Other Other (See Comments)     Tree and shrub pollen-sneezing       Social History     Tobacco Use    Smoking status: Never   Vaping Use    Vaping status: Never Used   Substance Use Topics    Alcohol use: Not Currently    Drug use: Never        Review of Systems   Constitutional:  Positive for fatigue. Negative for chills, diaphoresis and fever.   HENT:  Positive for congestion,

## 2025-07-07 ENCOUNTER — OFFICE VISIT (OUTPATIENT)
Age: 35
End: 2025-07-07

## 2025-07-07 VITALS
DIASTOLIC BLOOD PRESSURE: 84 MMHG | OXYGEN SATURATION: 98 % | HEART RATE: 97 BPM | TEMPERATURE: 98.3 F | SYSTOLIC BLOOD PRESSURE: 112 MMHG | RESPIRATION RATE: 14 BRPM

## 2025-07-07 DIAGNOSIS — R68.89 FLU-LIKE SYMPTOMS: ICD-10-CM

## 2025-07-07 DIAGNOSIS — J02.9 PHARYNGITIS, UNSPECIFIED ETIOLOGY: ICD-10-CM

## 2025-07-07 DIAGNOSIS — J06.9 VIRAL UPPER RESPIRATORY TRACT INFECTION: Primary | ICD-10-CM

## 2025-07-07 LAB
GROUP A STREP ANTIGEN, POC: NEGATIVE
INFLUENZA A ANTIGEN, POC: NEGATIVE
INFLUENZA B ANTIGEN, POC: NEGATIVE
LOT EXPIRE DATE: NORMAL
LOT KIT NUMBER: NORMAL
SARS-COV-2 RNA, POC: NEGATIVE
VALID INTERNAL CONTROL, POC: NORMAL
VALID INTERNAL CONTROL: NORMAL
VENDOR AND KIT NAME POC: NORMAL

## 2025-07-07 ASSESSMENT — ENCOUNTER SYMPTOMS
COUGH: 0
SINUS PRESSURE: 1
RESPIRATORY NEGATIVE: 1
SINUS PAIN: 0
RHINORRHEA: 0
SORE THROAT: 1

## 2025-07-07 NOTE — PROGRESS NOTES
PROGRESS NOTE    SUBJECTIVE:   Margarita Person is a 34 y.o. female seen in the employer based health center located at [facility] for cold symptoms. The patient did travel to [place] for a convention, last week. Symptoms of fever, congestion, and body aches started yesterday. The patient did take sudafed last night and this morning with some improvement.   Chief Complaint    Cold Symptoms           Cold Symptoms   This is a new problem. The current episode started yesterday. The problem has been gradually worsening. There has been no fever. Associated symptoms include congestion, headaches and a sore throat. Pertinent negatives include no coughing, ear pain, plugged ear sensation, rhinorrhea, sinus pain or sneezing. She has tried acetaminophen and decongestant (Saline spray) for the symptoms.       Current Outpatient Medications   Medication Sig Dispense Refill   • albuterol sulfate HFA (VENTOLIN HFA) 108 (90 Base) MCG/ACT inhaler Inhale 2 puffs into the lungs 4 times daily as needed for Wheezing 18 g 0   • ibuprofen (ADVIL;MOTRIN) 800 MG tablet Take 1 tablet by mouth every 6 hours 120 tablet 3   • cetirizine (ZYRTEC ALLERGY) 10 MG tablet Take 1 tablet by mouth daily     • Fe Fum-FePoly-FA-Vit C-Vit B3 (FOLIVANE-F) 125-1 MG CAPS        No current facility-administered medications for this visit.      Allergies   Allergen Reactions   • Other Other (See Comments)     Tree and shrub pollen-sneezing       Social History     Tobacco Use   • Smoking status: Never   Vaping Use   • Vaping status: Never Used   Substance Use Topics   • Alcohol use: Not Currently   • Drug use: Never        Review of Systems   Constitutional:  Positive for fever. Negative for fatigue.   HENT:  Positive for congestion, postnasal drip, sinus pressure and sore throat. Negative for ear pain, rhinorrhea, sinus pain and sneezing.    Respiratory: Negative.  Negative for cough.    Musculoskeletal:  Positive for myalgias.

## 2025-07-25 ENCOUNTER — OFFICE VISIT (OUTPATIENT)
Age: 35
End: 2025-07-25

## 2025-07-25 VITALS
HEART RATE: 83 BPM | TEMPERATURE: 97.6 F | DIASTOLIC BLOOD PRESSURE: 70 MMHG | OXYGEN SATURATION: 99 % | RESPIRATION RATE: 16 BRPM | SYSTOLIC BLOOD PRESSURE: 112 MMHG

## 2025-07-25 DIAGNOSIS — J32.9 RHINOSINUSITIS: Primary | ICD-10-CM

## 2025-07-25 PROBLEM — F41.1 GENERALIZED ANXIETY DISORDER: Status: ACTIVE | Noted: 2024-11-06

## 2025-07-25 RX ORDER — LORAZEPAM 0.5 MG/1
TABLET ORAL
COMMUNITY

## 2025-07-25 ASSESSMENT — ENCOUNTER SYMPTOMS
CHEST TIGHTNESS: 0
SORE THROAT: 1
EYE ITCHING: 0
SINUS COMPLAINT: 1
SWOLLEN GLANDS: 1
VOMITING: 0
SHORTNESS OF BREATH: 0
EYE PAIN: 0
COUGH: 0
SINUS PRESSURE: 1
EYE DISCHARGE: 0
ABDOMINAL PAIN: 0
HOARSE VOICE: 0
SINUS PAIN: 1
RHINORRHEA: 0
WHEEZING: 0
EYE REDNESS: 0
NAUSEA: 0

## 2025-07-25 NOTE — PROGRESS NOTES
PROGRESS NOTE    SUBJECTIVE:   Margarita Person is a 34 y.o. female seen in the employer based health center located at [facility] for suspected sinus infection as she has had persistent and worsening sinus congestion since last seen in office on 7/7/25 By Sarah Nunez NP. Reports significant worsening 4 days ago    Chief Complaint    Sinus Problem           History provided by:  Patient   used: No    Sinus Problem  This is a new problem. The current episode started 1 to 4 weeks ago. The problem has been gradually worsening since onset. There has been no fever. The pain is moderate. Associated symptoms include chills, congestion, sinus pressure, a sore throat and swollen glands. Pertinent negatives include no coughing, diaphoresis, ear pain, headaches, hoarse voice, neck pain, shortness of breath or sneezing. Treatments tried: tylenol PM, tylenol cold medications, pedialye, zyrtec. The treatment provided mild relief.       Current Outpatient Medications   Medication Sig Dispense Refill    amoxicillin-clavulanate (AUGMENTIN) 875-125 MG per tablet Take 1 tablet by mouth 2 times daily for 5 days 10 tablet 0    LORazepam (ATIVAN) 0.5 MG tablet Take 1 tablet as needed by oral route for 30 days, for anxiety.      albuterol sulfate HFA (VENTOLIN HFA) 108 (90 Base) MCG/ACT inhaler Inhale 2 puffs into the lungs 4 times daily as needed for Wheezing 18 g 0    ibuprofen (ADVIL;MOTRIN) 800 MG tablet Take 1 tablet by mouth every 6 hours 120 tablet 3    cetirizine (ZYRTEC ALLERGY) 10 MG tablet Take 1 tablet by mouth daily      Fe Fum-FePoly-FA-Vit C-Vit B3 (FOLIVANE-F) 125-1 MG CAPS        No current facility-administered medications for this visit.      Allergies   Allergen Reactions    Other Other (See Comments)     Tree and shrub pollen-sneezing       Social History     Tobacco Use    Smoking status: Never   Vaping Use    Vaping status: Never Used   Substance Use Topics    Alcohol use:

## 2025-08-13 ENCOUNTER — OFFICE VISIT (OUTPATIENT)
Age: 35
End: 2025-08-13

## 2025-08-13 VITALS
HEART RATE: 88 BPM | DIASTOLIC BLOOD PRESSURE: 70 MMHG | RESPIRATION RATE: 16 BRPM | TEMPERATURE: 98.2 F | SYSTOLIC BLOOD PRESSURE: 104 MMHG | OXYGEN SATURATION: 99 %

## 2025-08-13 DIAGNOSIS — J02.9 SORE THROAT: Primary | ICD-10-CM

## 2025-08-13 ASSESSMENT — ENCOUNTER SYMPTOMS
SORE THROAT: 1
VOMITING: 0
NAUSEA: 0
SINUS PRESSURE: 1
WHEEZING: 0
DIARRHEA: 0
RHINORRHEA: 0
SHORTNESS OF BREATH: 0
SINUS PAIN: 0
ABDOMINAL PAIN: 0
COUGH: 0